# Patient Record
(demographics unavailable — no encounter records)

---

## 2024-12-23 NOTE — CARDIOLOGY SUMMARY
[de-identified] : 06/20/24 ECG: AF, demand V-paced. No sig change from 1/2/24. [de-identified] : 7/9/2024 TTE: LVIDd 4.52 cm, LVEF 60-65% w/o regional WMA, RV is dilated with normal function, severe HELLEN, mechanical MV is noted w/ normal appearing function (mean TVG is 4.94 mmHg at a HR 65 bpm), severe TR with PASP 41 mmHg, IVC is dilated, no pericardial effusion  8/08/2023 DANIEL: LV not well visualized due to mitral prosthesis, RV dilated and RV systolic function is normal, HELLEN, ALISHA is surgically ligated-no communication is seen by color flow, mechanical MV in MV position w/ normal appearing function (mean TVG 5 mmHg, HR 73 bpm), severe TR w/ incomplete coaptation of TV, the septal leaflet is restricted, RASP 23 mmHg, no pericardial effusion  6/29/2023 TTE: LVIDD 4.81 cm, no LVH, LVEF 55-60% abnl septal wall motion seen w/ right ventricular volume overload, diastolic function challenging in the presence of bioprosthetic MV, RV is severely dilated and systolic function is mildly reduced, severe HELLEN, mechanical MV w/ normal appearing function (mean TVG 4 mmHg at 60 bpm), mild AR, severe TR, mild PH, PASP 43 mmHg, IVC dilated, no pericardial effusion  [de-identified] : 06/06/24 RHC: RA 10 (v-wave 18), RV 33/9, PA 35/12/20, PCW 10, PA 75%, Ao 95%, CO/CI (F) 4.1/3.0, PVR 2.4, Jaqui 23, IVC 12, hepatic wedge 12, transhepatic gradient 0. No BP given to calculate SVR.

## 2024-12-23 NOTE — REASON FOR VISIT
[FreeTextEntry1] : PATIENT INSTRUCTIONS:  1. Go back to see Dr. Bruner and he will do an echocardiogram. 2. Continue your current medications. 3. Follow-up with Dr. Escobedo in 6 months and continue routine care with Dr. Mckenna.

## 2024-12-23 NOTE — PHYSICAL EXAM
[de-identified] : MMM, no perioral cyanosis [de-identified] : pulsatile JVP 8 cm H2O without HJR [de-identified] : III/VI KAUSHAL at [facility] [de-identified] : nonpulsatile liver edge [de-identified] : warm and dry

## 2024-12-23 NOTE — HISTORY OF PRESENT ILLNESS
[FreeTextEntry1] : Ms. Alexandra Tong is a 55 YO F w/ preserved LV function, paroxysmal AFib, mitral stenosis due to rheumatic heart disease s/p mechanical MVR & ALISHA occlusion (2004), heart block s/p Micra PPM 5/2020 and severe TR____. She is here for routine follow-up with her  and an  service by telephone was utilized.  She has felt much better since the introduction of Jardiance in April along with a reduction of furosemide to 10 mg daily. 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 She has in the interval completed a follow up with SHREID/Dr. Bruner who had ordered a TTE (details below) and recommended a CPET__given the persistent severe TR__ She denies CP, SOB at rest, orthopnea, PND, LH/dizziness, abdominal discomfort, LE edema, palpitations, and syncope. Her appetite is normal, and her bladder habits are unchanged. She has a Micra PPM in place. She does not limit fluid but does reduce sodium in her diet and she is taking her medications as directed. She does not use NSAIDs. She has not been admitted to the hospital or seen in the ER for HF in the interim.

## 2024-12-23 NOTE — DISCUSSION/SUMMARY
[FreeTextEntry2] : and her  [FreeTextEntry1] : 52 yo F with mechanical MVR on warfarin, AFib, hypertension, and severe TR who is overall doing well and improved on current therapies. She is ACC/AHA Stage C right HF, NYHA Class II and euvolemic today. I have recommended the followin. NICM/Valvular Cardiomyopathy - Her TR was severe on echos dating as far back as  without overt sequalae until more recently when she developed RV/RA dilation and required diuretics. Much improved on SGLT2-i and recent RHC with reasonable filling pressures.  Labs 2024 Na 136, K 4.2, Cl 105, CO2 9, BUN 18, Cr 0.52, proBNP (2024) 192   2. Hypertension - well controlled on current therapies. It is difficult to know if she has diastolic impairment as the mechanical MV impedes the echo measurement. So, unknown if she has HFpEF due to her HTN, but the echo from  had normal biatrial size following the MVR and she currently has biatrial enlargement.  3. Pulm Htn - Recent RHC with PA . Improved with diuresis.  4. Severe TR - TTE requested at time of RHC (in recovery area), but it was not done. TTE on max GDMT in 2024 continued to demonstrate severe TR___ recommended for CPET to assess functional status.   5. Chronic AF - On beta blocker and digoxin with rate control and back-up Micra PPM. On warfarin more for mechanical MVR as she has ALISHA occlusion at time of surgery.  6. Follow-up with me in 6 months. Routine care with Dr. Mckenna and schedule with Dr. Bruner in coming weeks,

## 2025-01-17 NOTE — HISTORY OF PRESENT ILLNESS
[FreeTextEntry1] : / German # [id number]  Ms. Alexandra Tong is a 53 YO F w/ valvular CMY due to RHD, preserved LV function, AFib, mitral stenosis s/p mechanical MVR & ALISHA occlusion (2004), heart block s/p Micra PPM 5/2020 and persistent severe TR. She is here for routine follow-up with her  and  service by telephone was utilized.  She has felt much better since the introduction of Jardiance in April along with a reduction of furosemide to 10 mg daily. 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 She has in the interval completed a follow up with SHREID/Dr. Bruner who had ordered a TTE (details below) and recommended a CPET given the persistent severe TR to assess functional status.   She denies CP, SOB at rest, orthopnea, PND, LH/dizziness, abdominal discomfort, LE edema, palpitations, and syncope. Her appetite is normal, and her bladder habits are unchanged. She has a Micra PPM in place. She does not limit fluid but does reduce sodium in her diet and she is taking her medications as directed. She does not use NSAIDs. She has not been admitted to the hospital or seen in the ER for HF in the interim.

## 2025-01-17 NOTE — END OF VISIT
[FreeTextEntry3] : I, Dr. Escobedo, personally performed the evaluation and management (E/M) services for this established patient who presents today with (a) new problem(s)/exacerbation of (an) existing condition(s).  That E/M includes conducting the examination, assessing all new/exacerbated conditions, and establishing a new plan of care.  Today, my EVAN, Veronika Star, was here to observe my evaluation and management services for this new problem/exacerbated condition to be followed going forward.  [Time Spent: ___ minutes] : I have spent [unfilled] minutes of time on the encounter which excludes teaching and separately reported services.

## 2025-01-17 NOTE — PHYSICAL EXAM
[Normal S1, S2] : normal S1, S2 [No Rub] : no rub [No Gallop] : no gallop [Murmur] : murmur [Soft] : abdomen soft [Non Tender] : non-tender [Normal Bowel Sounds] : normal bowel sounds [Normal Radial B/L] : normal radial B/L [Normal] : moves all extremities, no focal deficits, normal speech [Alert and Oriented] : alert and oriented [No Xanthelasma] : no xanthelasma [de-identified] : MMM, no perioral cyanosis [de-identified] : pulsatile JVP 10-12 cm H2O with prominent V waves [de-identified] : III/VI KAUSHAL at [facility] [de-identified] : nonpulsatile liver edge [de-identified] : warm and dry

## 2025-01-17 NOTE — PHYSICAL EXAM
[Normal S1, S2] : normal S1, S2 [No Rub] : no rub [No Gallop] : no gallop [Murmur] : murmur [Soft] : abdomen soft [Non Tender] : non-tender [Normal Bowel Sounds] : normal bowel sounds [Normal Radial B/L] : normal radial B/L [Normal] : moves all extremities, no focal deficits, normal speech [Alert and Oriented] : alert and oriented [No Xanthelasma] : no xanthelasma [de-identified] : MMM, no perioral cyanosis [de-identified] : pulsatile JVP 10-12 cm H2O with prominent V waves [de-identified] : III/VI KAUSHAL at [facility] [de-identified] : nonpulsatile liver edge [de-identified] : warm and dry

## 2025-01-17 NOTE — PHYSICAL EXAM
[Normal S1, S2] : normal S1, S2 [No Rub] : no rub [No Gallop] : no gallop [Murmur] : murmur [Soft] : abdomen soft [Non Tender] : non-tender [Normal Bowel Sounds] : normal bowel sounds [Normal Radial B/L] : normal radial B/L [Normal] : moves all extremities, no focal deficits, normal speech [Alert and Oriented] : alert and oriented [No Xanthelasma] : no xanthelasma [de-identified] : MMM, no perioral cyanosis [de-identified] : pulsatile JVP 10-12 cm H2O with prominent V waves [de-identified] : III/VI KAUSHAL at [facility] [de-identified] : nonpulsatile liver edge [de-identified] : warm and dry

## 2025-01-17 NOTE — REASON FOR VISIT
[Cardiac Failure] : cardiac failure [Language Line ] : provided by Language Line   [Time Spent: ____ minutes] : Total time spent using  services: [unfilled] minutes. The patient's primary language is not English thus required  services. [Interpreters_IDNumber] : [id number] [TWNoteComboBox1] : Maltese [FreeTextEntry1] : PATIENT INSTRUCTIONS:  1. Continue your current medicatons 2. Check labs today 3. We will schedule a 6 minute walk test at location: [address] [place] [address] 4. Follow up with Dr. Escobedo in 6 months

## 2025-01-17 NOTE — HISTORY OF PRESENT ILLNESS
[FreeTextEntry1] : / Slovenian # [id number]  Ms. Alexandra Tong is a 55 YO F w/ valvular CMY due to RHD, preserved LV function, AFib, mitral stenosis s/p mechanical MVR & ALISHA occlusion (2004), heart block s/p Micra PPM 5/2020 and persistent severe TR. She is here for routine follow-up with her  and  service by telephone was utilized.  She has felt much better since the introduction of Jardiance in April along with a reduction of furosemide to 10 mg daily. 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 She has in the interval completed a follow up with SHREID/Dr. Bruner who had ordered a TTE (details below) and recommended a CPET given the persistent severe TR to assess functional status.   She denies CP, SOB at rest, orthopnea, PND, LH/dizziness, abdominal discomfort, LE edema, palpitations, and syncope. Her appetite is normal, and her bladder habits are unchanged. She has a Micra PPM in place. She does not limit fluid but does reduce sodium in her diet and she is taking her medications as directed. She does not use NSAIDs. She has not been admitted to the hospital or seen in the ER for HF in the interim.

## 2025-01-17 NOTE — CARDIOLOGY SUMMARY
[de-identified] : 1/17/2025 ECG: V paced at 69 bpm, no sig change compared to 6/20/2024 06/20/24 ECG: AF, demand V-paced. No sig change from 1/2/24. [de-identified] : 7/9/2024 TTE: LVIDd 4.52 cm, LVEF 60-65% w/o regional WMA, RV is dilated with normal function, severe HELLEN, mechanical MV is noted w/ normal appearing function (mean TVG is 4.94 mmHg at a HR 65 bpm), severe TR with PASP 41 mmHg, IVC is dilated, no pericardial effusion  8/08/2023 DANIEL: LV not well visualized due to mitral prosthesis, RV dilated and RV systolic function is normal, HELLEN, ALISHA is surgically ligated-no communication is seen by color flow, mechanical MV in MV position w/ normal appearing function (mean TVG 5 mmHg, HR 73 bpm), severe TR w/ incomplete coaptation of TV, the septal leaflet is restricted, RASP 23 mmHg, no pericardial effusion  6/29/2023 TTE: LVIDD 4.81 cm, no LVH, LVEF 55-60% abnl septal wall motion seen w/ right ventricular volume overload, diastolic function challenging in the presence of bioprosthetic MV, RV is severely dilated and systolic function is mildly reduced, severe HELLEN, mechanical MV w/ normal appearing function (mean TVG 4 mmHg at 60 bpm), mild AR, severe TR, mild PH, PASP 43 mmHg, IVC dilated, no pericardial effusion  [de-identified] : 06/06/24 RHC: RA 10 (v-wave 18), RV 33/9, PA 35/12/20, PCW 10, PA 75%, Ao 95%, CO/CI (F) 4.1/3.0, PVR 2.4, Jaqui 23, IVC 12, hepatic wedge 12, transhepatic gradient 0. No BP given to calculate SVR.

## 2025-01-17 NOTE — REASON FOR VISIT
[Cardiac Failure] : cardiac failure [Language Line ] : provided by Language Line   [Time Spent: ____ minutes] : Total time spent using  services: [unfilled] minutes. The patient's primary language is not English thus required  services. [Interpreters_IDNumber] : [id number] [TWNoteComboBox1] : Chadian [FreeTextEntry1] : PATIENT INSTRUCTIONS:  1. Continue your current medicatons 2. Check labs today 3. We will schedule a 6 minute walk test at location: [address] [place] [address] 4. Follow up with Dr. Escobedo in 6 months

## 2025-01-17 NOTE — HISTORY OF PRESENT ILLNESS
[FreeTextEntry1] : / Wolof # [id number]  Ms. Alexandra Tong is a 55 YO F w/ valvular CMY due to RHD, preserved LV function, AFib, mitral stenosis s/p mechanical MVR & ALISHA occlusion (2004), heart block s/p Micra PPM 5/2020 and persistent severe TR. She is here for routine follow-up with her  and  service by telephone was utilized.  She has felt much better since the introduction of Jardiance in April along with a reduction of furosemide to 10 mg daily. 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 She has in the interval completed a follow up with SHREID/Dr. Bruner who had ordered a TTE (details below) and recommended a CPET given the persistent severe TR to assess functional status.   She denies CP, SOB at rest, orthopnea, PND, LH/dizziness, abdominal discomfort, LE edema, palpitations, and syncope. Her appetite is normal, and her bladder habits are unchanged. She has a Micra PPM in place. She does not limit fluid but does reduce sodium in her diet and she is taking her medications as directed. She does not use NSAIDs. She has not been admitted to the hospital or seen in the ER for HF in the interim.

## 2025-01-17 NOTE — DISCUSSION/SUMMARY
[Patient] : the patient [EKG obtained to assist in diagnosis and management of assessed problem(s)] : EKG obtained to assist in diagnosis and management of assessed problem(s) [FreeTextEntry2] : and her  [FreeTextEntry1] : 53 yo F with RHD s/p mechanical MVR on warfarin, AFib, hypertension, and severe TR who is overall doing well and improved on current therapies. She is ACC/AHA Stage C HF, NYHA Class I-II and euvolemic today. I have recommended the followin. NICM/Valvular Cardiomyopathy - Her TR was severe on echos dating as far back as  without overt sequalae until more recently when she developed RV/RA dilation and required diuretics. Much improved on SGLT2-i and recent RHC with good filling pressures. Will obtain 6MWT to formally assess functional status but her self report sounds to be minimally limited. Labs 2024 Na 136, K 4.2, Cl 105, CO2 9, BUN 18, Cr 0.52, proBNP (2024) 192. Check labs today including CMP/proBNP. If she becomes more symptomatic, would consider switching ACE-I and CCB to ARNI.  2. Hypertension - well controlled on current therapies. It is difficult to know if she has diastolic impairment as the mechanical MV impedes the echo measurement. No LVH to suggest she has HFpEF due to HHD.    3. Pulm Htn - Improved based on last RHC with PA 35//20.   4. Severe TR - TTE on current therapies in 2024 showed severe TR but she seems asymptomatic. Will get 6MWT for quantifiable functional assessment.   5. Chronic AF - s/p ALISHA occlusion. On beta blocker and digoxin with rate control and back-up Micra PPM. On warfarin for mechanical MVR.  6. Follow-up with Dr. Escobedo in 6 months. Routine care with Dr. Mckenna.

## 2025-01-17 NOTE — PHYSICAL EXAM
[Normal S1, S2] : normal S1, S2 [No Rub] : no rub [No Gallop] : no gallop [Murmur] : murmur [Soft] : abdomen soft [Non Tender] : non-tender [Normal Bowel Sounds] : normal bowel sounds [Normal Radial B/L] : normal radial B/L [Normal] : moves all extremities, no focal deficits, normal speech [Alert and Oriented] : alert and oriented [No Xanthelasma] : no xanthelasma [de-identified] : MMM, no perioral cyanosis [de-identified] : pulsatile JVP 10-12 cm H2O with prominent V waves [de-identified] : III/VI KAUSHAL at [facility] [de-identified] : nonpulsatile liver edge [de-identified] : warm and dry

## 2025-01-17 NOTE — REASON FOR VISIT
[Cardiac Failure] : cardiac failure [Language Line ] : provided by Language Line   [Time Spent: ____ minutes] : Total time spent using  services: [unfilled] minutes. The patient's primary language is not English thus required  services. [Interpreters_IDNumber] : [id number] [TWNoteComboBox1] : Kazakh [FreeTextEntry1] : PATIENT INSTRUCTIONS:  1. Continue your current medicatons 2. Check labs today 3. We will schedule a 6 minute walk test at location: [address] [place] [address] 4. Follow up with Dr. Escobedo in 6 months

## 2025-01-17 NOTE — CARDIOLOGY SUMMARY
[de-identified] : 1/17/2025 ECG: V paced at 69 bpm, no sig change compared to 6/20/2024 06/20/24 ECG: AF, demand V-paced. No sig change from 1/2/24. [de-identified] : 7/9/2024 TTE: LVIDd 4.52 cm, LVEF 60-65% w/o regional WMA, RV is dilated with normal function, severe HELLEN, mechanical MV is noted w/ normal appearing function (mean TVG is 4.94 mmHg at a HR 65 bpm), severe TR with PASP 41 mmHg, IVC is dilated, no pericardial effusion  8/08/2023 DANIEL: LV not well visualized due to mitral prosthesis, RV dilated and RV systolic function is normal, HELLEN, ALISHA is surgically ligated-no communication is seen by color flow, mechanical MV in MV position w/ normal appearing function (mean TVG 5 mmHg, HR 73 bpm), severe TR w/ incomplete coaptation of TV, the septal leaflet is restricted, RASP 23 mmHg, no pericardial effusion  6/29/2023 TTE: LVIDD 4.81 cm, no LVH, LVEF 55-60% abnl septal wall motion seen w/ right ventricular volume overload, diastolic function challenging in the presence of bioprosthetic MV, RV is severely dilated and systolic function is mildly reduced, severe HELLEN, mechanical MV w/ normal appearing function (mean TVG 4 mmHg at 60 bpm), mild AR, severe TR, mild PH, PASP 43 mmHg, IVC dilated, no pericardial effusion  [de-identified] : 06/06/24 RHC: RA 10 (v-wave 18), RV 33/9, PA 35/12/20, PCW 10, PA 75%, Ao 95%, CO/CI (F) 4.1/3.0, PVR 2.4, Jaqui 23, IVC 12, hepatic wedge 12, transhepatic gradient 0. No BP given to calculate SVR.

## 2025-01-17 NOTE — HISTORY OF PRESENT ILLNESS
[FreeTextEntry1] : / Sinhala # [id number]  Ms. Alexandra Tong is a 53 YO F w/ valvular CMY due to RHD, preserved LV function, AFib, mitral stenosis s/p mechanical MVR & ALISHA occlusion (2004), heart block s/p Micra PPM 5/2020 and persistent severe TR. She is here for routine follow-up with her  and  service by telephone was utilized.  She has felt much better since the introduction of Jardiance in April along with a reduction of furosemide to 10 mg daily. 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 She has in the interval completed a follow up with SHREID/Dr. Bruner who had ordered a TTE (details below) and recommended a CPET given the persistent severe TR to assess functional status.   She denies CP, SOB at rest, orthopnea, PND, LH/dizziness, abdominal discomfort, LE edema, palpitations, and syncope. Her appetite is normal, and her bladder habits are unchanged. She has a Micra PPM in place. She does not limit fluid but does reduce sodium in her diet and she is taking her medications as directed. She does not use NSAIDs. She has not been admitted to the hospital or seen in the ER for HF in the interim.

## 2025-01-17 NOTE — CARDIOLOGY SUMMARY
[de-identified] : 1/17/2025 ECG: V paced at 69 bpm, no sig change compared to 6/20/2024 06/20/24 ECG: AF, demand V-paced. No sig change from 1/2/24. [de-identified] : 7/9/2024 TTE: LVIDd 4.52 cm, LVEF 60-65% w/o regional WMA, RV is dilated with normal function, severe HELLEN, mechanical MV is noted w/ normal appearing function (mean TVG is 4.94 mmHg at a HR 65 bpm), severe TR with PASP 41 mmHg, IVC is dilated, no pericardial effusion  8/08/2023 DANIEL: LV not well visualized due to mitral prosthesis, RV dilated and RV systolic function is normal, HELLEN, ALISHA is surgically ligated-no communication is seen by color flow, mechanical MV in MV position w/ normal appearing function (mean TVG 5 mmHg, HR 73 bpm), severe TR w/ incomplete coaptation of TV, the septal leaflet is restricted, RASP 23 mmHg, no pericardial effusion  6/29/2023 TTE: LVIDD 4.81 cm, no LVH, LVEF 55-60% abnl septal wall motion seen w/ right ventricular volume overload, diastolic function challenging in the presence of bioprosthetic MV, RV is severely dilated and systolic function is mildly reduced, severe HELLEN, mechanical MV w/ normal appearing function (mean TVG 4 mmHg at 60 bpm), mild AR, severe TR, mild PH, PASP 43 mmHg, IVC dilated, no pericardial effusion  [de-identified] : 06/06/24 RHC: RA 10 (v-wave 18), RV 33/9, PA 35/12/20, PCW 10, PA 75%, Ao 95%, CO/CI (F) 4.1/3.0, PVR 2.4, Jaqui 23, IVC 12, hepatic wedge 12, transhepatic gradient 0. No BP given to calculate SVR.

## 2025-01-17 NOTE — REASON FOR VISIT
[Cardiac Failure] : cardiac failure [Language Line ] : provided by Language Line   [Time Spent: ____ minutes] : Total time spent using  services: [unfilled] minutes. The patient's primary language is not English thus required  services. [Interpreters_IDNumber] : [id number] [TWNoteComboBox1] : Central African [FreeTextEntry1] : PATIENT INSTRUCTIONS:  1. Continue your current medicatons 2. Check labs today 3. We will schedule a 6 minute walk test at location: [address] [place] [address] 4. Follow up with Dr. Escobedo in 6 months

## 2025-01-17 NOTE — CARDIOLOGY SUMMARY
[de-identified] : 1/17/2025 ECG: V paced at 69 bpm, no sig change compared to 6/20/2024 06/20/24 ECG: AF, demand V-paced. No sig change from 1/2/24. [de-identified] : 7/9/2024 TTE: LVIDd 4.52 cm, LVEF 60-65% w/o regional WMA, RV is dilated with normal function, severe HELLEN, mechanical MV is noted w/ normal appearing function (mean TVG is 4.94 mmHg at a HR 65 bpm), severe TR with PASP 41 mmHg, IVC is dilated, no pericardial effusion  8/08/2023 DANIEL: LV not well visualized due to mitral prosthesis, RV dilated and RV systolic function is normal, HELLEN, ALISHA is surgically ligated-no communication is seen by color flow, mechanical MV in MV position w/ normal appearing function (mean TVG 5 mmHg, HR 73 bpm), severe TR w/ incomplete coaptation of TV, the septal leaflet is restricted, RASP 23 mmHg, no pericardial effusion  6/29/2023 TTE: LVIDD 4.81 cm, no LVH, LVEF 55-60% abnl septal wall motion seen w/ right ventricular volume overload, diastolic function challenging in the presence of bioprosthetic MV, RV is severely dilated and systolic function is mildly reduced, severe HELLEN, mechanical MV w/ normal appearing function (mean TVG 4 mmHg at 60 bpm), mild AR, severe TR, mild PH, PASP 43 mmHg, IVC dilated, no pericardial effusion  [de-identified] : 06/06/24 RHC: RA 10 (v-wave 18), RV 33/9, PA 35/12/20, PCW 10, PA 75%, Ao 95%, CO/CI (F) 4.1/3.0, PVR 2.4, Jaqui 23, IVC 12, hepatic wedge 12, transhepatic gradient 0. No BP given to calculate SVR.

## 2025-05-10 NOTE — PHYSICAL EXAM
[Well Developed] : well developed [Well Nourished] : well nourished [No Acute Distress] : no acute distress [Normal Conjunctiva] : normal conjunctiva [Normal S1, S2] : normal S1, S2 [No Rub] : no rub [No Gallop] : no gallop [Murmur] : murmur [No Cyanosis] : no cyanosis [No Clubbing] : no clubbing [Edema ___] : edema [unfilled] [Normal] : alert and oriented, normal memory [de-identified] : supple  [de-identified] : III/VI [facility] [de-identified] : + mechanical heart sounds

## 2025-05-10 NOTE — PHYSICAL EXAM
[Well Developed] : well developed [Well Nourished] : well nourished [No Acute Distress] : no acute distress [Normal Conjunctiva] : normal conjunctiva [Normal S1, S2] : normal S1, S2 [No Rub] : no rub [No Gallop] : no gallop [Murmur] : murmur [No Cyanosis] : no cyanosis [No Clubbing] : no clubbing [Edema ___] : edema [unfilled] [Normal] : alert and oriented, normal memory [de-identified] : supple  [de-identified] : III/VI [facility] [de-identified] : + mechanical heart sounds

## 2025-05-10 NOTE — HISTORY OF PRESENT ILLNESS
[FreeTextEntry1] : Cardiologist: Dr. Mckenna HF: Dr. Escobedo   54[facility] [place] [facility] with PMH of HTN, PAF, mitral stenosis due to rheumatic heart disease s/p mechanical MVR & ALISHA occlusion (2004, at [facility], on warfarin), heart block s/p Micra PPM 5/2020, HFpEF and severe TR who presents for follow up.   Patient was seen in [facility] back in 8/2023 at which time DANIEL review showed incomplete coaptation of the tricuspid valve. The septal leaflet is restricted. There is severe tricuspid regurgitation. Treatment options were discussed. Patient was referred to Dr. Aleman (CT surgeon) for surgical consideration with STS of 1-3% operative mortality and complication risk. Dr. Aleman felt that patient would benefit from and is a candidate for Reop, minimally invasive, TV repair/possible replacement w/bioprosthesis.   Patient has been following up with her cardiologist and with Dr. Escobedo. Was on stable GDMT, then underwent RHC on 6/10/24 that showed RA 10 (V wave 18 mm Hg), RV 33/9, PA 35/12 (20), PCWP 10, Ao Sat 95%, PA Sat 75%, CO/CI 4.1/3, TPG 10 mmHg, PVR 2.4 TRAORE, ANDREW 23.  Seen in [facility] on 7/9/25 for follow up at which time patient reported feeling "good". Exercise tolerance of 0-30 blocks or 15 steps when going up a flight of stairs exercise tolerance limited by fatigue. 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 Reported intermittent BLE edema and with 2 dental carries pending treatment.  TTE on 7/9/24 showed normal BIV function, moderately dilated RV, severe HELLEN, severe/torrential (5/5) atrial functional TR, mechanical mitral valve with normal function. Est PASP 41 mmHg. In the absence of symptoms and good quality of life, Dr. Bruner d/w Dr. Escobedo re: possibly obtaining a CPEP to objective assess her functional status.   CPET 9/5/24 Interpretation: Submaximal test stopped by technician due to patient difficulty with test protocol. Low normal peak VO2 and mildly reduced peak work. Excess heart rate reserve. Normal O2 pulse, though this can overstate stroke volume in the setting of beta blockage. Excess breathing reserve. Normal gas exchange.   2/27/2025 6MWT: Rest /60, O2 98% on RA. Post Walk /58, O2 97% on RA. Max HR 96 bpm. Total distance is 406 meters consistent with NYHA Class II data.  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 Occasionally runs 2-3 blocks to catch a train without issue. Rare episode of profound fatigue with some shortness a breath, had one episode in the past 3 months. +BLE edema that is worse at night better in the morning. Denies any recent chest pain, worsening shortness of breath, palpitations, lightheadedness/dizziness or syncope, orthopnea, PND, abdominal bloating, fever, chills, night sweats, dysuria, or recent hospitalizations. Has a molar that needs a root canal.

## 2025-05-10 NOTE — PLAN
[TextEntry] : - continue current medication regimen. - IE Abx ppx 30-60 min prior to dental procedure.  - continue close follow up with Dr. Escobedo team for GDMT management. - follow up with Dr. Mckenna as scheduled for ongoing cardiology care to help coordinate her endodontist needs. - continue warfarin per patient's anticoagulation clinic.  - return to [facility] in 6 months with repeat TTE/labs and a visit with Dr. Bruner for clinical reassessment or as needed.   I, Dr. Wade Bruner, personally performed the evaluation and management (E/M) services for this established patient who presents today with (a) new problem(s)/exacerbation of (an) existing condition(s). That E/M includes conducting the clinically appropriate interval history &/or exam, assessing all new/exacerbated conditions, and establishing a new plan of care. Today, my EVAN, noted herewith, was here to observe my evaluation and management service for this new problem/exacerbated condition and follow the plan of care established by me going forward.

## 2025-05-10 NOTE — REASON FOR VISIT
[Structural Heart and Valve Disease] : structural heart and valve disease [Language Line ] : provided by Language Line   [Time Spent: ____ minutes] : Total time spent using  services: [unfilled] minutes. The patient's primary language is not English thus required  services. [Interpreters_IDNumber] : [id number] [Interpreters_FullName] : Ibrahima [TWNoteComboBox1] : Canadian

## 2025-05-10 NOTE — ASSESSMENT
[FreeTextEntry1] : 54F, Colombian speaking from [place], with PMH of HTN, PAF, mitral stenosis due to rheumatic heart disease s/p mechanical MVR & ALISHA occlusion (2004, at [facility], on warfarin), heart block s/p Micra PPM 5/2020, HFpEF and severe TR who presents for follow up. Dr. Bruner has independently seen and evaluated the patient in this face-to-face encounter and has reviewed patient's history and pertinent clinical data. Patient is clinically stable, NYHA Class I/II, well compensated on exam with no increasing diuretic requirement since last seen in July 2024. TTE on 4/28/25 showed normal BiV function, moderate RV enlargement, mechanical mitral valve well seated with normal function, torrential TR - Compared to the transthoracic echocardiogram performed on 7/9/2024, there have been no significant interval changes. Results discussed with patient. Given good quality of life and absence of significant symptoms on good GDMT, Dr. Bruner recommends continuing close clinical monitoring at this time. The patient will continue to follow up with HF for GDMT management, her cardiologist for ongoing cardiology care, and we advised that she should proceed with getting a root canal for her molar - her cardiologist/dentist/anticoagulation clinic should coordinate with her AC management given mechanical mitral. Reminded the need for IE Abx ppx prior to any dental procedure. The patient will return to [facility] in 6 months with TTE/labs and an outpatient visit with Dr. Bruner for clinical reassessment or sooner should she develop signs of worsening HF symptoms. All questions were addressed.

## 2025-05-10 NOTE — CARDIOLOGY SUMMARY
[de-identified] : 6/20/24: V-paced/Afib rate 65 [de-identified] :  DANIEL 8/8/23 CONCLUSIONS: 1. LV systolic function not wel visualized due to shadowing from mitral prosthesis. 2. Dilated right ventricular size. 3. Normal right ventricular systolic function. 4. A mechanical valve is noted in the mitral position which appears to be functioning normally. The mean transvalvular gradient is 5.00 mmHg at a heart rate of 73 bpm. There is trace mitral regurgitation. 5. Aortic sclerosis without significant stenosis. 6. Trace aortic regurgitation. 7. There is incomplete coaptation of the tricuspid valve. The septal leaflet motion is restricted. There is severe tricuspid regurgitation. Pulmonary artery systolic pressure (estimated using the tricuspid regurgitant gradient and an estimate of right atrial pressure) is 23 mmHg. 8. No evidence of an intracardiac shunt. 9. No pericardial effusion.  TTE 6/29/23 CONCLUSIONS: 1. Normal left ventricular size and systolic function. 2. Severely dilated right ventricular size. 3. Mildly reduced right ventricular systolic function. 4. Biatrial enlargement. 5. Mechanical valve is seen in the mitral position with apparent normal function, without evidence of prosthetic dysfunction. 6. Severe (massive, 4+) tricuspid regurgitation. 7. Mild pulmonary hypertension.  [de-identified] : 6/6/24:  The right heart catheterization demonstrated normal cardiac output with mildly elevated right sided filling pressures and no significant PH.  Hepatic wedge 12 mmHg, IVC 12 mmHg- no gradient - RA 10, v wave 18mmHg - RV 33/9 mmHg - Pa 35/12 (20) mmHg - PCWP 10mmHg Ao 95%, Pa 75% CO/CI 4.1 L/min, 3.0/L/min/m2 - TPG 10mmHg - PVR 2.4 TRAORE - ANDREW 23  [de-identified] : 7/1/24 Labs reviewed:  WBC 3.82 H/H 12/38.7 Plt 161 BUN/Cr 18/0.52  6/20/24: ProBNP 192

## 2025-05-10 NOTE — PHYSICAL EXAM
[Well Developed] : well developed [Well Nourished] : well nourished [No Acute Distress] : no acute distress [Normal Conjunctiva] : normal conjunctiva [Normal S1, S2] : normal S1, S2 [No Rub] : no rub [No Gallop] : no gallop [Murmur] : murmur [No Cyanosis] : no cyanosis [No Clubbing] : no clubbing [Edema ___] : edema [unfilled] [Normal] : alert and oriented, normal memory [de-identified] : supple  [de-identified] : III/VI [facility] [de-identified] : + mechanical heart sounds

## 2025-05-10 NOTE — REASON FOR VISIT
[Structural Heart and Valve Disease] : structural heart and valve disease [Language Line ] : provided by Language Line   [Time Spent: ____ minutes] : Total time spent using  services: [unfilled] minutes. The patient's primary language is not English thus required  services. [Interpreters_IDNumber] : [id number] [Interpreters_FullName] : Ibrahima [TWNoteComboBox1] : Sammarinese

## 2025-05-10 NOTE — HISTORY OF PRESENT ILLNESS
[FreeTextEntry1] : Cardiologist: Dr. Mckenna HF: Dr. Escobedo   54[place], with PMH of HTN, PAF, mitral stenosis due to rheumatic heart disease s/p mechanical MVR & ALISHA occlusion (2004, at [facility], on warfarin), heart block s/p Micra PPM 5/2020, HFpEF and severe TR who presents for follow up.   Patient was seen in [facility] back in 8/2023 at which time DANIEL review showed incomplete coaptation of the tricuspid valve. The septal leaflet is restricted. There is severe tricuspid regurgitation. Treatment options were discussed. Patient was referred to Dr. Aleman (CT surgeon) for surgical consideration with STS of 1-3% operative mortality and complication risk. Dr. Aleman felt that patient would benefit from and is a candidate for Reop, minimally invasive, TV repair/possible replacement w/bioprosthesis.   Patient has been following up with her cardiologist and with Dr. Escobedo. Was on stable GDMT, then underwent RHC on 6/10/24 that showed RA 10 (V wave 18 mm Hg), RV 33/9, PA 35/12 (20), PCWP 10, Ao Sat 95%, PA Sat 75%, CO/CI 4.1/3, TPG 10 mmHg, PVR 2.4 TRAORE, ANDREW 23.  Seen in [facility] on 7/9/25 for follow up at which time patient reported feeling "good". Exercise tolerance of 0-30 blocks or 15 steps when going up a flight of stairs exercise tolerance limited by fatigue. 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 Reported intermittent BLE edema and with 2 dental carries pending treatment.  TTE on 7/9/24 showed normal BIV function, moderately dilated RV, severe HELLEN, severe/torrential (5/5) atrial functional TR, mechanical mitral valve with normal function. Est PASP 41 mmHg. In the absence of symptoms and good quality of life, Dr. Bruner d/w Dr. Escobedo re: possibly obtaining a CPEP to objective assess her functional status.   CPET 9/5/24 Interpretation: Submaximal test stopped by technician due to patient difficulty with test protocol. Low normal peak VO2 and mildly reduced peak work. Excess heart rate reserve. Normal O2 pulse, though this can overstate stroke volume in the setting of beta blockage. Excess breathing reserve. Normal gas exchange.   2/27/2025 6MWT: Rest /60, O2 98% on RA. Post Walk /58, O2 97% on RA. Max HR 96 bpm. Total distance is 406 meters consistent with NYHA Class II data.  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 Occasionally runs 2-3 blocks to catch a train without issue. Rare episode of profound fatigue with some shortness a breath, had one episode in the past 3 months. +BLE edema that is worse at night better in the morning. Denies any recent chest pain, worsening shortness of breath, palpitations, lightheadedness/dizziness or syncope, orthopnea, PND, abdominal bloating, fever, chills, night sweats, dysuria, or recent hospitalizations. Has a molar that needs a root canal.

## 2025-05-10 NOTE — CARDIOLOGY SUMMARY
[de-identified] : 6/20/24: V-paced/Afib rate 65 [de-identified] :  DANIEL 8/8/23 CONCLUSIONS: 1. LV systolic function not wel visualized due to shadowing from mitral prosthesis. 2. Dilated right ventricular size. 3. Normal right ventricular systolic function. 4. A mechanical valve is noted in the mitral position which appears to be functioning normally. The mean transvalvular gradient is 5.00 mmHg at a heart rate of 73 bpm. There is trace mitral regurgitation. 5. Aortic sclerosis without significant stenosis. 6. Trace aortic regurgitation. 7. There is incomplete coaptation of the tricuspid valve. The septal leaflet motion is restricted. There is severe tricuspid regurgitation. Pulmonary artery systolic pressure (estimated using the tricuspid regurgitant gradient and an estimate of right atrial pressure) is 23 mmHg. 8. No evidence of an intracardiac shunt. 9. No pericardial effusion.  TTE 6/29/23 CONCLUSIONS: 1. Normal left ventricular size and systolic function. 2. Severely dilated right ventricular size. 3. Mildly reduced right ventricular systolic function. 4. Biatrial enlargement. 5. Mechanical valve is seen in the mitral position with apparent normal function, without evidence of prosthetic dysfunction. 6. Severe (massive, 4+) tricuspid regurgitation. 7. Mild pulmonary hypertension.  [de-identified] : 6/6/24:  The right heart catheterization demonstrated normal cardiac output with mildly elevated right sided filling pressures and no significant PH.  Hepatic wedge 12 mmHg, IVC 12 mmHg- no gradient - RA 10, v wave 18mmHg - RV 33/9 mmHg - Pa 35/12 (20) mmHg - PCWP 10mmHg Ao 95%, Pa 75% CO/CI 4.1 L/min, 3.0/L/min/m2 - TPG 10mmHg - PVR 2.4 TRAORE - ANRDEW 23  [de-identified] : 7/1/24 Labs reviewed:  WBC 3.82 H/H 12/38.7 Plt 161 BUN/Cr 18/0.52  6/20/24: ProBNP 192

## 2025-05-10 NOTE — PLAN
[TextEntry] : - continue current medication regimen. - IE Abx ppx 30-60 min prior to dental procedure.  - continue close follow up with Dr. Escobedo team for GDMT management. - follow up with Dr. Mckenna as scheduled for ongoing cardiology care to help coordinate her endodontist needs. - continue warfarin per patient's anticoagulation clinic.  - return to [facility] in 6 months with repeat TTE/labs and a visit with Dr. Bruner for clinical reassessment or as needed.   I, Dr. Wade Bruner, personally performed the evaluation and management (E/M) services for this established patient who presents today with (a) new problem(s)/exacerbation of (an) existing condition(s). That E/M includes conducting the clinically appropriate interval history &/or exam, assessing all new/exacerbated conditions, and establishing a new plan of care. Today, my EVAN, noted herewith, was here to observe my evaluation and management service for this new problem/exacerbated condition and follow the plan of care established by me going forward.

## 2025-05-10 NOTE — REVIEW OF SYSTEMS
[Feeling Fatigued] : feeling fatigued [Lower Ext Edema] : lower extremity edema [Easy Bleeding] : a tendency for easy bleeding [Easy Bruising] : a tendency for easy bruising [Negative] : Psychiatric [Fever] : no fever [Headache] : no headache [Chills] : no chills [SOB] : no shortness of breath [Dyspnea on exertion] : not dyspnea during exertion [Chest Discomfort] : no chest discomfort [Leg Claudication] : no intermittent leg claudication [Palpitations] : no palpitations [Orthopnea] : no orthopnea [PND] : no PND [Syncope] : no syncope [FreeTextEntry2] : see HPI  [FreeTextEntry1] : + dental sensitivity

## 2025-05-10 NOTE — HISTORY OF PRESENT ILLNESS
[FreeTextEntry1] : Cardiologist: Dr. Mckenna HF: Dr. Escobedo   54[place], [place] [facility], with PMH of HTN, PAF, mitral stenosis due to rheumatic heart disease s/p mechanical MVR & ALISHA occlusion (2004, at [facility], on warfarin), heart block s/p Micra PPM 5/2020, HFpEF and severe TR who presents for follow up.   Patient was seen in [facility] back in 8/2023 at which time DANIEL review showed incomplete coaptation of the tricuspid valve. The septal leaflet is restricted. There is severe tricuspid regurgitation. Treatment options were discussed. Patient was referred to Dr. Aleman (CT surgeon) for surgical consideration with STS of 1-3% operative mortality and complication risk. Dr. Aleman felt that patient would benefit from and is a candidate for Reop, minimally invasive, TV repair/possible replacement w/bioprosthesis.   Patient has been following up with her cardiologist and with Dr. Escobedo. Was on stable GDMT, then underwent RHC on 6/10/24 that showed RA 10 (V wave 18 mm Hg), RV 33/9, PA 35/12 (20), PCWP 10, Ao Sat 95%, PA Sat 75%, CO/CI 4.1/3, TPG 10 mmHg, PVR 2.4 TRAORE, ANDREW 23.  Seen in [facility] on 7/9/25 for follow up at which time patient reported feeling "good". Exercise tolerance of 0-30 blocks or 15 steps when going up a flight of stairs exercise tolerance limited by fatigue. 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 Reported intermittent BLE edema and with 2 dental carries pending treatment.  TTE on 7/9/24 showed normal BIV function, moderately dilated RV, severe HELLEN, severe/torrential (5/5) atrial functional TR, mechanical mitral valve with normal function. Est PASP 41 mmHg. In the absence of symptoms and good quality of life, Dr. Bruner d/w Dr. Escobedo re: possibly obtaining a CPEP to objective assess her functional status.   CPET 9/5/24 Interpretation: Submaximal test stopped by technician due to patient difficulty with test protocol. Low normal peak VO2 and mildly reduced peak work. Excess heart rate reserve. Normal O2 pulse, though this can overstate stroke volume in the setting of beta blockage. Excess breathing reserve. Normal gas exchange.   2/27/2025 6MWT: Rest /60, O2 98% on RA. Post Walk /58, O2 97% on RA. Max HR 96 bpm. Total distance is 406 meters consistent with NYHA Class II data.  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 Occasionally runs 2-3 blocks to catch a train without issue. Rare episode of profound fatigue with some shortness a breath, had one episode in the past 3 months. +BLE edema that is worse at night better in the morning. Denies any recent chest pain, worsening shortness of breath, palpitations, lightheadedness/dizziness or syncope, orthopnea, PND, abdominal bloating, fever, chills, night sweats, dysuria, or recent hospitalizations. Has a molar that needs a root canal.

## 2025-05-10 NOTE — PLAN
[TextEntry] : - continue current medication regimen. - IE Abx ppx 30-60 min prior to dental procedure.  - continue close follow up with Dr. Escobedo team for GDMT management. - follow up with Dr. Mckenna as scheduled for ongoing cardiology care to help coordinate her endodontist needs. - continue warfarin per patient's anticoagulation clinic.  - return to [facility] in 6 months with repeat TTE/labs and a visit with Dr. Bruner for clinical reassessment or as needed.   I, Dr. Wade Bruner, personally performed the evaluation and management (E/M) services for this established patient who presents today with (a) new problem(s)/exacerbation of (an) existing condition(s). That E/M includes conducting the clinically appropriate interval history &/or exam, assessing all new/exacerbated conditions, and establishing a new plan of care. Today, my EVAN, noted herewith, was here to observe my evaluation and management service for this new problem/exacerbated condition and follow the plan of care established by me going forward.

## 2025-05-10 NOTE — PLAN
[TextEntry] : - continue current medication regimen. - IE Abx ppx 30-60 min prior to dental procedure.  - continue close follow up with Dr. Escobedo team for GDMT management. - follow up with Dr. Mckenna as scheduled for ongoing cardiology care to help coordinate her endodontist needs. - continue warfarin per patient's anticoagulation clinic.  - return to [facility] in 6 months with repeat TTE/labs and a visit with Dr. Bruner for clinical reassessment or as needed.   I, Dr. Wade Bruner, personally performed the evaluation and management (E/M) services for this established patient who presents today with (a) new problem(s)/exacerbation of (an) existing condition(s). That E/M includes conducting the clinically appropriate interval history &/or exam, assessing all new/exacerbated conditions, and establishing a new plan of care. Today, my EVAN, noted herewith, was here to observe my evaluation and management service for this new problem/exacerbated condition and follow the plan of care established by me going forward.

## 2025-05-10 NOTE — REASON FOR VISIT
[Structural Heart and Valve Disease] : structural heart and valve disease [Language Line ] : provided by Language Line   [Time Spent: ____ minutes] : Total time spent using  services: [unfilled] minutes. The patient's primary language is not English thus required  services. [Interpreters_IDNumber] : [id number] [Interpreters_FullName] : Ibrahima [TWNoteComboBox1] : Citizen of [place]

## 2025-05-10 NOTE — CARDIOLOGY SUMMARY
[de-identified] : 6/20/24: V-paced/Afib rate 65 [de-identified] :  DANIEL 8/8/23 CONCLUSIONS: 1. LV systolic function not wel visualized due to shadowing from mitral prosthesis. 2. Dilated right ventricular size. 3. Normal right ventricular systolic function. 4. A mechanical valve is noted in the mitral position which appears to be functioning normally. The mean transvalvular gradient is 5.00 mmHg at a heart rate of 73 bpm. There is trace mitral regurgitation. 5. Aortic sclerosis without significant stenosis. 6. Trace aortic regurgitation. 7. There is incomplete coaptation of the tricuspid valve. The septal leaflet motion is restricted. There is severe tricuspid regurgitation. Pulmonary artery systolic pressure (estimated using the tricuspid regurgitant gradient and an estimate of right atrial pressure) is 23 mmHg. 8. No evidence of an intracardiac shunt. 9. No pericardial effusion.  TTE 6/29/23 CONCLUSIONS: 1. Normal left ventricular size and systolic function. 2. Severely dilated right ventricular size. 3. Mildly reduced right ventricular systolic function. 4. Biatrial enlargement. 5. Mechanical valve is seen in the mitral position with apparent normal function, without evidence of prosthetic dysfunction. 6. Severe (massive, 4+) tricuspid regurgitation. 7. Mild pulmonary hypertension.  [de-identified] : 6/6/24:  The right heart catheterization demonstrated normal cardiac output with mildly elevated right sided filling pressures and no significant PH.  Hepatic wedge 12 mmHg, IVC 12 mmHg- no gradient - RA 10, v wave 18mmHg - RV 33/9 mmHg - Pa 35/12 (20) mmHg - PCWP 10mmHg Ao 95%, Pa 75% CO/CI 4.1 L/min, 3.0/L/min/m2 - TPG 10mmHg - PVR 2.4 TRAORE - ANDREW 23  [de-identified] : 7/1/24 Labs reviewed:  WBC 3.82 H/H 12/38.7 Plt 161 BUN/Cr 18/0.52  6/20/24: ProBNP 192

## 2025-05-10 NOTE — CARDIOLOGY SUMMARY
[de-identified] : 6/20/24: V-paced/Afib rate 65 [de-identified] :  DANIEL 8/8/23 CONCLUSIONS: 1. LV systolic function not wel visualized due to shadowing from mitral prosthesis. 2. Dilated right ventricular size. 3. Normal right ventricular systolic function. 4. A mechanical valve is noted in the mitral position which appears to be functioning normally. The mean transvalvular gradient is 5.00 mmHg at a heart rate of 73 bpm. There is trace mitral regurgitation. 5. Aortic sclerosis without significant stenosis. 6. Trace aortic regurgitation. 7. There is incomplete coaptation of the tricuspid valve. The septal leaflet motion is restricted. There is severe tricuspid regurgitation. Pulmonary artery systolic pressure (estimated using the tricuspid regurgitant gradient and an estimate of right atrial pressure) is 23 mmHg. 8. No evidence of an intracardiac shunt. 9. No pericardial effusion.  TTE 6/29/23 CONCLUSIONS: 1. Normal left ventricular size and systolic function. 2. Severely dilated right ventricular size. 3. Mildly reduced right ventricular systolic function. 4. Biatrial enlargement. 5. Mechanical valve is seen in the mitral position with apparent normal function, without evidence of prosthetic dysfunction. 6. Severe (massive, 4+) tricuspid regurgitation. 7. Mild pulmonary hypertension.  [de-identified] : 6/6/24:  The right heart catheterization demonstrated normal cardiac output with mildly elevated right sided filling pressures and no significant PH.  Hepatic wedge 12 mmHg, IVC 12 mmHg- no gradient - RA 10, v wave 18mmHg - RV 33/9 mmHg - Pa 35/12 (20) mmHg - PCWP 10mmHg Ao 95%, Pa 75% CO/CI 4.1 L/min, 3.0/L/min/m2 - TPG 10mmHg - PVR 2.4 TRAORE - ANDREW 23  [de-identified] : 7/1/24 Labs reviewed:  WBC 3.82 H/H 12/38.7 Plt 161 BUN/Cr 18/0.52  6/20/24: ProBNP 192

## 2025-05-10 NOTE — HISTORY OF PRESENT ILLNESS
[FreeTextEntry1] : Cardiologist: Dr. Mckenna HF: Dr. Escobedo   54[place], [place] [facility], with PMH of HTN, PAF, mitral stenosis due to rheumatic heart disease s/p mechanical MVR & ALISHA occlusion (2004, at [facility], on warfarin), heart block s/p Micra PPM 5/2020, HFpEF and severe TR who presents for follow up.   Patient was seen in [facility] back in 8/2023 at which time DANIEL review showed incomplete coaptation of the tricuspid valve. The septal leaflet is restricted. There is severe tricuspid regurgitation. Treatment options were discussed. Patient was referred to Dr. Aleman (CT surgeon) for surgical consideration with STS of 1-3% operative mortality and complication risk. Dr. Aleman felt that patient would benefit from and is a candidate for Reop, minimally invasive, TV repair/possible replacement w/bioprosthesis.   Patient has been following up with her cardiologist and with Dr. Escobedo. Was on stable GDMT, then underwent RHC on 6/10/24 that showed RA 10 (V wave 18 mm Hg), RV 33/9, PA 35/12 (20), PCWP 10, Ao Sat 95%, PA Sat 75%, CO/CI 4.1/3, TPG 10 mmHg, PVR 2.4 TRAORE, ANDREW 23.  Seen in [facility] on 7/9/25 for follow up at which time patient reported feeling "good". Exercise tolerance of 0-30 blocks or 15 steps when going up a flight of stairs exercise tolerance limited by fatigue. 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 Reported intermittent BLE edema and with 2 dental carries pending treatment.  TTE on 7/9/24 showed normal BIV function, moderately dilated RV, severe HELLEN, severe/torrential (5/5) atrial functional TR, mechanical mitral valve with normal function. Est PASP 41 mmHg. In the absence of symptoms and good quality of life, Dr. Bruner d/w Dr. Escobedo re: possibly obtaining a CPEP to objective assess her functional status.   CPET 9/5/24 Interpretation: Submaximal test stopped by technician due to patient difficulty with test protocol. Low normal peak VO2 and mildly reduced peak work. Excess heart rate reserve. Normal O2 pulse, though this can overstate stroke volume in the setting of beta blockage. Excess breathing reserve. Normal gas exchange.   2/27/2025 6MWT: Rest /60, O2 98% on RA. Post Walk /58, O2 97% on RA. Max HR 96 bpm. Total distance is 406 meters consistent with NYHA Class II data.  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 Occasionally runs 2-3 blocks to catch a train without issue. Rare episode of profound fatigue with some shortness a breath, had one episode in the past 3 months. +BLE edema that is worse at night better in the morning. Denies any recent chest pain, worsening shortness of breath, palpitations, lightheadedness/dizziness or syncope, orthopnea, PND, abdominal bloating, fever, chills, night sweats, dysuria, or recent hospitalizations. Has a molar that needs a root canal.

## 2025-05-10 NOTE — ASSESSMENT
[FreeTextEntry1] : 54F, Polish speaking from [place], with PMH of HTN, PAF, mitral stenosis due to rheumatic heart disease s/p mechanical MVR & ALISHA occlusion (2004, at [facility], on warfarin), heart block s/p Micra PPM 5/2020, HFpEF and severe TR who presents for follow up. Dr. Bruner has independently seen and evaluated the patient in this face-to-face encounter and has reviewed patient's history and pertinent clinical data. Patient is clinically stable, NYHA Class I/II, well compensated on exam with no increasing diuretic requirement since last seen in July 2024. TTE on 4/28/25 showed normal BiV function, moderate RV enlargement, mechanical mitral valve well seated with normal function, torrential TR - Compared to the transthoracic echocardiogram performed on 7/9/2024, there have been no significant interval changes. Results discussed with patient. Given good quality of life and absence of significant symptoms on good GDMT, Dr. Bruner recommends continuing close clinical monitoring at this time. The patient will continue to follow up with HF for GDMT management, her cardiologist for ongoing cardiology care, and we advised that she should proceed with getting a root canal for her molar - her cardiologist/dentist/anticoagulation clinic should coordinate with her AC management given mechanical mitral. Reminded the need for IE Abx ppx prior to any dental procedure. The patient will return to [facility] in 6 months with TTE/labs and an outpatient visit with Dr. Bruner for clinical reassessment or sooner should she develop signs of worsening HF symptoms. All questions were addressed.

## 2025-05-10 NOTE — PHYSICAL EXAM
[Well Developed] : well developed [Well Nourished] : well nourished [No Acute Distress] : no acute distress [Normal Conjunctiva] : normal conjunctiva [Normal S1, S2] : normal S1, S2 [No Rub] : no rub [No Gallop] : no gallop [Murmur] : murmur [No Cyanosis] : no cyanosis [No Clubbing] : no clubbing [Edema ___] : edema [unfilled] [Normal] : alert and oriented, normal memory Yes [de-identified] : supple  [de-identified] : III/VI [facility] [de-identified] : + mechanical heart sounds

## 2025-05-10 NOTE — ASSESSMENT
[FreeTextEntry1] : 54F, [place]ut[place] speaking from [place], with PMH of HTN, PAF, mitral stenosis due to rheumatic heart disease s/p mechanical MVR & ALISHA occlusion (2004, at [facility], on warfarin), heart block s/p Micra PPM 5/2020, HFpEF and severe TR who presents for follow up. Dr. Bruner has independently seen and evaluated the patient in this face-to-face encounter and has reviewed patient's history and pertinent clinical data. Patient is clinically stable, NYHA Class I/II, well compensated on exam with no increasing diuretic requirement since last seen in July 2024. TTE on 4/28/25 showed normal BiV function, moderate RV enlargement, mechanical mitral valve well seated with normal function, torrential TR - Compared to the transthoracic echocardiogram performed on 7/9/2024, there have been no significant interval changes. Results discussed with patient. Given good quality of life and absence of significant symptoms on good GDMT, Dr. Bruner recommends continuing close clinical monitoring at this time. The patient will continue to follow up with HF for GDMT management, her cardiologist for ongoing cardiology care, and we advised that she should proceed with getting a root canal for her molar - her cardiologist/dentist/anticoagulation clinic should coordinate with her AC management given mechanical mitral. Reminded the need for IE Abx ppx prior to any dental procedure. The patient will return to [facility] clinic in 6 months with TTE/labs and an outpatient visit with Dr. Bruner for clinical reassessment or sooner should she develop signs of worsening HF symptoms. All questions were addressed.

## 2025-05-22 NOTE — PHYSICAL EXAM
[Normal] : the outer ears and nose were normal in appearance and the oropharynx was normal [No JVD] : no jugular venous distention [No Lymphadenopathy] : no lymphadenopathy [Supple] : supple [No Respiratory Distress] : no respiratory distress  [No Accessory Muscle Use] : no accessory muscle use [Clear to Auscultation] : lungs were clear to auscultation bilaterally [Normal Rate] : normal rate  [Regular Rhythm] : with a regular rhythm [Normal S1, S2] : normal S1 and S2 [Pedal Pulses Present] : the pedal pulses are present [No Edema] : there was no peripheral edema [Soft] : abdomen soft [Non Tender] : non-tender [Non-distended] : non-distended [No Rash] : no rash [Coordination Grossly Intact] : coordination grossly intact [No Focal Deficits] : no focal deficits [Normal Gait] : normal gait [Speech Grossly Normal] : speech grossly normal [Normal Affect] : the affect was normal [Alert and Oriented x3] : oriented to person, place, and time

## 2025-05-25 NOTE — ASSESSMENT
[FreeTextEntry1] : RTC in 5-10 weeks for annual CPE   Case d/w Dr. OLIVER Young  - Curtis Aleman MD   Internal Medicine, PGY-3   [facility], [facility]

## 2025-05-25 NOTE — HISTORY OF PRESENT ILLNESS
[de-identified] :  ID: [id number]  50 y/o female with PMH RHD s/p MVR on warfarin (Goal 2.5-3.5), pAfib s/p DCCV c/b Tachy/Glenn syndrome s/p MICRA, dilated RV and diastolic dysfunction with severe TR seen on echo p/f follow up. Patient reports feeling well. She states she had an "allergic reaction" a few weeks ago. This was the first reaction she had since 5 years ago when she had an anaphylactic reaction suspected 2/2 quinoa. This time she did not have a rash or difficulty breathing/throat swelling. She states she noticed swelling and redness of her fingers and having significant cough. She cannot identify any trigger that could have caused this. She states her son was present and used an old epipen they had which provided relief. During the episode she never had any wheezing/difficulty breathing, nausea/vomiting, etc. Did not notice any rash. Otherwise she feels and denies any chest pain, palpitations, dizziness, nausea/vomiting

## 2025-05-25 NOTE — HISTORY OF PRESENT ILLNESS
[de-identified] :  ID: [id number]  52 y/o female with PMH RHD s/p MVR on warfarin (Goal 2.5-3.5), pAfib s/p DCCV c/b Tachy/Glenn syndrome s/p MICRA, dilated RV and diastolic dysfunction with severe TR seen on echo p/f follow up. Patient reports feeling well. She states she had an "allergic reaction" a few weeks ago. This was the first reaction she had since 5 years ago when she had an anaphylactic reaction suspected 2/2 quinoa. This time she did not have a rash or difficulty breathing/throat swelling. She states she noticed swelling and redness of her fingers and having significant cough. She cannot identify any trigger that could have caused this. She states her son was present and used an old epipen they had which provided relief. During the episode she never had any wheezing/difficulty breathing, nausea/vomiting, etc. Did not notice any rash. Otherwise she feels and denies any chest pain, palpitations, dizziness, nausea/vomiting

## 2025-05-25 NOTE — REVIEW OF SYSTEMS
[Fever] : no fever [Chills] : no chills [Night Sweats] : no night sweats [Recent Change In Weight] : ~T no recent weight change [Discharge] : no discharge [Pain] : no pain [Vision Problems] : no vision problems [Earache] : no earache [Hearing Loss] : no hearing loss [Nasal Discharge] : no nasal discharge [Chest Pain] : no chest pain [Palpitations] : no palpitations [Lower Ext Edema] : no lower extremity edema [Orthopnea] : no orthopnea [Paroxysmal Nocturnal Dyspnea] : no paroxysmal nocturnal dyspnea [Shortness Of Breath] : no shortness of breath [Wheezing] : no wheezing [Cough] : no cough [Dyspnea on Exertion] : no dyspnea on exertion [Abdominal Pain] : no abdominal pain [Nausea] : no nausea [Constipation] : no constipation [Vomiting] : no vomiting [Heartburn] : no heartburn [Dysuria] : no dysuria [Hematuria] : no hematuria [Frequency] : no frequency [Joint Pain] : no joint pain [Joint Stiffness] : no joint stiffness [Muscle Pain] : no muscle pain [Itching] : no itching [Skin Rash] : no skin rash [Headache] : no headache [Dizziness] : no dizziness

## 2025-06-26 NOTE — PROCEDURE
[FreeTextEntry1] : RLE duplex performed to evaluate for residual R intramuscular hematoma demonstrates

## 2025-07-02 NOTE — REASON FOR VISIT
[Consultation] : a consultation visit [Post Hospitalization] : a post hospitalization visit [FreeTextEntry1] : R calf hematoma

## 2025-07-02 NOTE — ADDENDUM
[FreeTextEntry1] : This note was written by Ana Maria Dee NP, acting as a scribe for Dr. Carroll Luna.  I, Dr. Carroll Luna, have read and attest that all the information, medical decision-making, and discharge instructions within are true and accurate.  Recently admitted for R shin hematoma. Managed conservatively. Still with some tenderness, but per patient her swelling has improved. has some residual ecchymosis. Not can walk and flex/extend foot more. Follow up 1 month.   I, Dr. Carroll Luna, personally performed the evaluation and management (E/M) services for this new patient.  That E/M includes conducting the initial examination, assessing all conditions, and establishing the plan of care.  Today, my ACP, Ana Maria Dee NP, was here to observe my evaluation and management services for this patient to be followed going forward.

## 2025-07-02 NOTE — PHYSICAL EXAM
[Normal Thyroid] : the thyroid was normal [Carotid Bruits] : no carotid bruits [Normal Breath Sounds] : Normal breath sounds [Respiratory Effort] : normal respiratory effort [Normal Heart Sounds] : normal heart sounds [Normal Rate and Rhythm] : normal rate and rhythm [Right Carotid Bruit] : no bruit heard over the right carotid [Left Carotid Bruit] : no bruit heard over the left carotid [2+] : left 2+ [Ankle Swelling (On Exam)] : present [Ankle Swelling On The Right] : mild [No Rash or Lesion] : No rash or lesion [Alert] : alert [Calm] : calm [Ankle Swelling On The Left] : moderate [JVD] : no jugular venous distention  [Varicose Veins Of Lower Extremities] : not present [] : not present [Abdomen Masses] : No abdominal masses [Abdomen Tenderness] : ~T ~M No abdominal tenderness [Purpura] : no purpura  [Petechiae] : no petechiae [Skin Ulcer] : no ulcer [Skin Induration] : no induration [de-identified] : Healthy, NAD [de-identified] : NC/AT, anicteric [FreeTextEntry1] : RLE with moderate edema from knee down to toes. Worse distally and ecchymosis more prominent on medial/posterior knee, medial ankle and 2nd/3rd toes. Active ROM of R knee, ankle and toes, limited by pain and swelling.  [de-identified] : FROM throughout (see cardiovasc section for details on RLE), no palpable cords noted in either LE [de-identified] : Neurosensory/neuromotor grossly intact

## 2025-07-02 NOTE — ASSESSMENT
[Arterial/Venous Disease] : arterial/venous disease [FreeTextEntry1] : 4yoF w/PMHx of HTN, HLD, pAfib on warfarin, mitral stenosis due to rheumatic heart disease (now s/p mechanical MVR), CAD w/LAD occlusion now s/p Micra PPM, HFpEF, severe TR, pulmonary HTN, cirrhosis, presented to the ED 06/07 after a traumatic fall.  Pt states that he developed severe bruising of the RLE, CTA RLE in the ED demonstrated R AT muscle hematoma, stable on subsequent imaging.  INR was corrected while in-house and pt was discharged after some complications w/home PT. Today, she presents for f/u in the office. Reports finally this weekend she has been able to apply more pressure on the foot a walk a little around the house. She is still in a lot of pain and reports some improvement in the swelling by the knee but feels the ankle and foot/toes are more swollen. She also notes the bruising is now on the toes and medial aspect of the foot. She is receiving PT services. Accompanied by daughter. She brings paperwork to be filled out for work as she cannot currently work.   On exam, RLE with moderate edema from knee down to toes. Worse distally and ecchymosis more prominent on medial/posterior knee, medial ankle and 2nd/3rd toes. Active ROM of R knee, ankle and toes, limited by pain and swelling.   Plan: -Daily use of light compression bandages when out of bed. Pt and daughter educated on proper placement -Moisturize daily the skin using mineral oil, Aquaphor ointment and/or vitamin A&D ointment -Avoid prolonged periods of time with legs in dependent position. Recommend leg elevation above the level of the heart 1 hour in the morning and 1 hour in the afternoon -Calf pump exercises while resting -Continue PT sessions -Fall precautions reviewed -F/u 1 mo and will determine if pt ready to work

## 2025-07-02 NOTE — HISTORY OF PRESENT ILLNESS
[FreeTextEntry1] : 54yoF w/PMHx of HTN, HLD, pAfib on warfarin, mitral stenosis due to rheumatic heart disease (now s/p mechanical MVR), CAD w/LAD occlusion now s/p Micra PPM, HFpEF, severe TR, pulmonary HTN, cirrhosis, presented to the ED 06/07 after a traumatic fall.  Pt states that he developed severe bruising of the RLE, CTA RLE in the ED demonstrated R AT muscle hematoma, stable on subsequent imaging.  INR was corrected while in-house and pt was discharged after some complications w/home PT. Today, she presents for f/u in the office. Reports finally this weekend she has been able to apply more pressure on the foot a walk a little around the house. She is still in a lot of pain and reports some improvement in the swelling by the knee but feels the ankle and foot/toes are more swollen. She also notes the bruising is now on the toes and medial aspect of the foot. She is receiving PT services. Accompanied by daughter. She brings paperwork to be filled out for work as she cannot currently work.

## 2025-07-02 NOTE — PHYSICAL EXAM
[Normal Thyroid] : the thyroid was normal [Carotid Bruits] : no carotid bruits [Normal Breath Sounds] : Normal breath sounds [Respiratory Effort] : normal respiratory effort [Normal Heart Sounds] : normal heart sounds [Normal Rate and Rhythm] : normal rate and rhythm [Right Carotid Bruit] : no bruit heard over the right carotid [Left Carotid Bruit] : no bruit heard over the left carotid [2+] : left 2+ [Ankle Swelling (On Exam)] : present [Ankle Swelling On The Right] : mild [No Rash or Lesion] : No rash or lesion [Alert] : alert [Calm] : calm [Ankle Swelling On The Left] : moderate [JVD] : no jugular venous distention  [Varicose Veins Of Lower Extremities] : not present [] : not present [Abdomen Masses] : No abdominal masses [Abdomen Tenderness] : ~T ~M No abdominal tenderness [Purpura] : no purpura  [Petechiae] : no petechiae [Skin Ulcer] : no ulcer [Skin Induration] : no induration [de-identified] : Healthy, NAD [de-identified] : NC/AT, anicteric [FreeTextEntry1] : RLE with moderate edema from knee down to toes. Worse distally and ecchymosis more prominent on medial/posterior knee, medial ankle and 2nd/3rd toes. Active ROM of R knee, ankle and toes, limited by pain and swelling.  [de-identified] : FROM throughout (see cardiovasc section for details on RLE), no palpable cords noted in either LE [de-identified] : Neurosensory/neuromotor grossly intact

## 2025-07-02 NOTE — PHYSICAL EXAM
[Normal Thyroid] : the thyroid was normal [Carotid Bruits] : no carotid bruits [Normal Breath Sounds] : Normal breath sounds [Respiratory Effort] : normal respiratory effort [Normal Heart Sounds] : normal heart sounds [Normal Rate and Rhythm] : normal rate and rhythm [Right Carotid Bruit] : no bruit heard over the right carotid [Left Carotid Bruit] : no bruit heard over the left carotid [2+] : left 2+ [Ankle Swelling (On Exam)] : present [Ankle Swelling On The Right] : mild [No Rash or Lesion] : No rash or lesion [Alert] : alert [Calm] : calm [Ankle Swelling On The Left] : moderate [JVD] : no jugular venous distention  [Varicose Veins Of Lower Extremities] : not present [] : not present [Abdomen Masses] : No abdominal masses [Abdomen Tenderness] : ~T ~M No abdominal tenderness [Purpura] : no purpura  [Petechiae] : no petechiae [Skin Ulcer] : no ulcer [Skin Induration] : no induration [de-identified] : Healthy, NAD [de-identified] : NC/AT, anicteric [FreeTextEntry1] : RLE with moderate edema from knee down to toes. Worse distally and ecchymosis more prominent on medial/posterior knee, medial ankle and 2nd/3rd toes. Active ROM of R knee, ankle and toes, limited by pain and swelling.  [de-identified] : FROM throughout (see cardiovasc section for details on RLE), no palpable cords noted in either LE [de-identified] : Neurosensory/neuromotor grossly intact

## 2025-07-25 NOTE — DISCUSSION/SUMMARY
[Patient] : the patient [EKG obtained to assist in diagnosis and management of assessed problem(s)] : EKG obtained to assist in diagnosis and management of assessed problem(s) [FreeTextEntry2] : and her  [FreeTextEntry1] : 55 yo F with RHD s/p mechanical MVR on warfarin, AFib, hypertension, and severe TR who is overall doing well and improved on current therapies. She is ACC/AHA Stage C HF, NYHA Class II objectively based on 6MWT 2025 and is euvolemic. I have recommended the followin. NICM/Valvular Cardiomyopathy - Her TR was severe on echos dating as far back as  without overt sequelae until more recently, when she developed RV/RA dilation and required diuretics. Much improved on SGLT2-i and RHC in  with good filling pressures. Start Spironolactone 12.5 mg QD. If she becomes symptomatic, would consider switching ACE-I and CCB to ARNI. Labs from  (in [facility]) with K 4.3, Cr 0.5 and last pro-BNP in January remained normal at 120.   2. Hypertension - well controlled on current therapies. It is difficult to know if she has diastolic impairment as the mechanical MV impedes the echo measurement. No LVH to suggest she has HFpEF due to HHD.  No longer following Dr. Mckenna, will refer to a general cardiologist via [facility] care.   3. Pulm HTN - Improved based on last RHC in  with PA 35/.   4. Severe TR - TTE on current therapies in 2024 showed severe TR but she reports good quality of life without notable limitation though 6MWT 2025 consistent with NYHA Class II.   5. Transaminitis - Historically with periods of mild transaminase. AST/ALT in . She has never had formal evaluation for cardiac cirrhosis, referred to hepatology, Dr. Bradley Toribio.   6. Chronic AF - s/p ALISHA occlusion. On beta blocker and digoxin with rate control and back-up Micra PPM. On warfarin for mechanical MVR.  7. Pre-procedural cardiac risk assessment - No cardiac contraindication to undergo dental extraction but management of her anticoagulant, Coumadin will need to be addressed by her primary care team.   8. Follow-up with Dr. Escobedo in 6 months

## 2025-07-25 NOTE — REASON FOR VISIT
[Cardiac Failure] : cardiac failure [Language Line ] : provided by Language Line   [Interpreters_IDNumber] : [id number] [TWNoteComboBox1] : Congolese [FreeTextEntry1] : PATIENT INSTRUCTIONS:  1. Start Spironolactone 12.5 mg (1/2 tablet) once daily in the mornings  2. Labs in one week at your local [facility] lab  3. Will refer you to a liver specialist, Dr. Toribio as well as a general cardiologist  4. No issues from a heart failure perspective to undergo dental extraction but please follow-up with your primary care office for instructions on Coumadin  5. Follow-up with Dr. Escobedo in 6 months

## 2025-07-25 NOTE — CARDIOLOGY SUMMARY
[de-identified] : 7/18/25 ECG: electronic ventricular pacemaker at 81 bpm, no significant change from prior  1/17/2025 ECG: V paced at 69 bpm, no sig change compared to 6/20/2024 06/20/24 ECG: AF, demand V-paced. No sig change from 1/2/24. [de-identified] : 02/27/25 6MWT: Rest /60, O2 98% on RA. Post Walk /58, O2 97% on RA. Max HR 96 bpm. Total distance is 406 meters, consistent with NYHA Class II data.  [de-identified] : 04/28/25 TTE: LVIDd 4.2 cm, LVEF 61%, interventricular septum flattened in diastole consistent with RV volume overload, septum 0.8 cm, PWT 1 cm, mod RVE with normal function (TAPSE 2.4 cm), severe HELLEN, mechanical MV well seated with normal function (peak/mean gradient 15.7 and 4.33 mmHg respectively at a HR of 66 bpm), torrential TR, IVC dilated with abnormal inspiratory collapse   7/9/2024 TTE: LVIDd 4.52 cm, LVEF 60-65% w/o regional WMA, RV is dilated with normal function, severe HELLEN, mechanical MV is noted w/ normal appearing function (mean TVG is 4.94 mmHg at a HR 65 bpm), severe TR with PASP 41 mmHg, IVC is dilated, no pericardial effusion  8/08/2023 DANIEL: LV not well visualized due to mitral prosthesis, RV dilated and RV systolic function is normal, HELLEN, ALISHA is surgically ligated-no communication is seen by color flow, mechanical MV in MV position w/ normal appearing function (mean TVG 5 mmHg, HR 73 bpm), severe TR w/ incomplete coaptation of TV, the septal leaflet is restricted, RASP 23 mmHg, no pericardial effusion  6/29/2023 TTE: LVIDD 4.81 cm, no LVH, LVEF 55-60% abnl septal wall motion seen w/ right ventricular volume overload, diastolic function challenging in the presence of bioprosthetic MV, RV is severely dilated and systolic function is mildly reduced, severe HELLEN, mechanical MV w/ normal appearing function (mean TVG 4 mmHg at 60 bpm), mild AR, severe TR, mild PH, PASP 43 mmHg, IVC dilated, no pericardial effusion  [de-identified] : 06/06/24 RHC: RA 10 (v-wave 18), RV 33/9, PA 35/12/20, PCW 10, PA 75%, Ao 95%, CO/CI (F) 4.1/3.0, PVR 2.4, Jaqui 23, IVC 12, hepatic wedge 12, transhepatic gradient 0. No BP given to calculate SVR.

## 2025-07-25 NOTE — PHYSICAL EXAM
DISCHARGE SUMMARY:  Discharge Time: 1858  Via:  Wheelchair  Accompanied by:  staff  Verbal Instructions given: Yes  Verbalized understanding: Yes  Written Instructions given: Yes  Patient Able to Void: Yes  Discharged Stable Per MD Order: Yes     [No Xanthelasma] : no xanthelasma [Normal S1, S2] : normal S1, S2 [No Rub] : no rub [No Gallop] : no gallop [Murmur] : murmur [Soft] : abdomen soft [Non Tender] : non-tender [Normal Bowel Sounds] : normal bowel sounds [Normal Radial B/L] : normal radial B/L [Alert and Oriented] : alert and oriented [de-identified] : RLE trace edema. No LLE edema  [Normal] : no edema, no cyanosis, no clubbing, no varicosities [de-identified] : MMM, no perioral cyanosis [de-identified] : pulsatile JVP 10-12 cm H2O with prominent V waves [de-identified] : crisp mechanical S1, normal S2 [de-identified] : III/VI KAUSHAL at [facility] [de-identified] : nonpulsatile liver edge [de-identified] : warm and dry

## 2025-07-25 NOTE — HISTORY OF PRESENT ILLNESS
[FreeTextEntry1] : Ms. Alexandra Tong is a 53 YO Icelandic speaking F w/ valvular CMY due to RHD, preserved LV function, AFib, mitral stenosis s/p mechanical MVR & ALISHA occlusion (2004), heart block s/p Micra PPM 5/2020 and persistent severe TR. She is here for routine follow-up of her cardiomyopathy.   She has had longstanding severe TR dating back as far as 2011 without overt sequelae but more recently in 2024, TTE notable for RV/RA dilated requiring diuretics. She has felt much better since the introduction of Jardiance in April 2024 along with a reduction of furosemide to 10 mg daily. RHC w/ hepatic wedge pressure 6/2024 with good filling pressures.   ===  Earlier this year, underwent 6MWT to objectively define functional class, data consistent with NYHA Class II. She underwent TTE with Dr. Bruner in April and was informed no significant changes from prior. More recently, she was briefly admitted in June following traumatic mechanical fall c/b R anterior tibialis intramuscular hematoma with active hemorrhage noted on CTA. Stable on subsequent imaging. INR on admission 4.2 and corrected.   Still with pain in R leg but much improved and manageable without analgesics. Otherwise, no changes in breathing. She has been walking to her local supermarket which is 4 blocks each way and able to carry groceries without difficulties. 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 She denies CP, SOB at rest, orthopnea, PND, LH/dizziness, abdominal discomfort, LE edema, palpitations, and syncope. Her appetite is normal, and her bladder habits are unchanged. She has a Micra PPM in place. She does not limit fluid but does reduce sodium in her diet and she is taking her medications as directed. She does not use NSAIDs. She has not been admitted to the hospital or seen in the ER for HF in the interim.

## 2025-07-25 NOTE — DISCUSSION/SUMMARY
[Patient] : the patient [EKG obtained to assist in diagnosis and management of assessed problem(s)] : EKG obtained to assist in diagnosis and management of assessed problem(s) [FreeTextEntry2] : and her  [FreeTextEntry1] : 53 yo F with RHD s/p mechanical MVR on warfarin, AFib, hypertension, and severe TR who is overall doing well and improved on current therapies. She is ACC/AHA Stage C HF, NYHA Class II objectively based on 6MWT 2025 and is euvolemic. I have recommended the followin. NICM/Valvular Cardiomyopathy - Her TR was severe on echos dating as far back as  without overt sequelae until more recently, when she developed RV/RA dilation and required diuretics. Much improved on SGLT2-i and RHC in  with good filling pressures. Start Spironolactone 12.5 mg QD. If she becomes symptomatic, would consider switching ACE-I and CCB to ARNI. Labs from  (in [facility]) with K 4.3, Cr 0.5 and last pro-BNP in January remained normal at 120.   2. Hypertension - well controlled on current therapies. It is difficult to know if she has diastolic impairment as the mechanical MV impedes the echo measurement. No LVH to suggest she has HFpEF due to HHD.  No longer following Dr. Mckenna, will refer to a general cardiologist via [facility] care.   3. Pulm HTN - Improved based on last RHC in  with PA 35/.   4. Severe TR - TTE on current therapies in 2024 showed severe TR but she reports good quality of life without notable limitation though 6MWT 2025 consistent with NYHA Class II.   5. Transaminitis - Historically with periods of mild transaminase. AST/ALT in . She has never had formal evaluation for cardiac cirrhosis, referred to hepatology, Dr. Bradley Toribio.   6. Chronic AF - s/p ALISHA occlusion. On beta blocker and digoxin with rate control and back-up Micra PPM. On warfarin for mechanical MVR.  7. Pre-procedural cardiac risk assessment - No cardiac contraindication to undergo dental extraction but management of her anticoagulant, Coumadin will need to be addressed by her primary care team.   8. Follow-up with Dr. Escobedo in 6 months

## 2025-07-25 NOTE — REASON FOR VISIT
[Cardiac Failure] : cardiac failure [Language Line ] : provided by Language [employer]   [Interpreters_IDNumber] : [id number] [TWNoteComboBox1] : Ivorian [FreeTextEntry1] : PATIENT INSTRUCTIONS:  1. Start Spironolactone 12.5 mg (1/2 tablet) once daily in the mornings  2. Labs in one week at your local [facility] lab  3. Will refer you to a liver specialist, Dr. Toribio as well as a general cardiologist  4. No issues from a heart failure perspective to undergo dental extraction but please follow-up with your primary care office for instructions on Coumadin  5. Follow-up with Dr. Escobedo in 6 months

## 2025-07-25 NOTE — HISTORY OF PRESENT ILLNESS
[FreeTextEntry1] : Ms. Alexandra Tong is a 55 YO Hebrew speaking F w/ valvular CMY due to RHD, preserved LV function, AFib, mitral stenosis s/p mechanical MVR & ALISHA occlusion (2004), heart block s/p Micra PPM 5/2020 and persistent severe TR. She is here for routine follow-up of her cardiomyopathy.   She has had longstanding severe TR dating back as far as 2011 without overt sequelae but more recently in 2024, TTE notable for RV/RA dilated requiring diuretics. She has felt much better since the introduction of Jardiance in April 2024 along with a reduction of furosemide to 10 mg daily. RHC w/ hepatic wedge pressure 6/2024 with good filling pressures.   ===  Earlier this year, underwent 6MWT to objectively define functional class, data consistent with NYHA Class II. She underwent TTE with Dr. Bruner in April and was informed no significant changes from prior. More recently, she was briefly admitted in June following traumatic mechanical fall c/b R anterior tibialis intramuscular hematoma with active hemorrhage noted on CTA. Stable on subsequent imaging. INR on admission 4.2 and corrected.   Still with pain in R leg but much improved and manageable without analgesics. Otherwise, no changes in breathing. She has been walking to her local supermarket which is 4 blocks each way and able to carry groceries without difficulties. 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 She denies CP, SOB at rest, orthopnea, PND, LH/dizziness, abdominal discomfort, LE edema, palpitations, and syncope. Her appetite is normal, and her bladder habits are unchanged. She has a Micra PPM in place. She does not limit fluid but does reduce sodium in her diet and she is taking her medications as directed. She does not use NSAIDs. She has not been admitted to the hospital or seen in the ER for HF in the interim.

## 2025-07-25 NOTE — END OF VISIT
[Time Spent: ___ minutes] : I have spent [unfilled] minutes of time on the encounter which excludes teaching and separately reported services. [FreeTextEntry3] : I, Dr. Escobedo, personally performed the evaluation and management (E/M) services for this established patient who presents today with (a) new problem(s)/exacerbation of (an) existing condition(s).  That E/M includes conducting the examination, assessing all new/exacerbated conditions, and establishing a new plan of care.  Today, my EVAN, [employer], was here to observe my evaluation and management services for this new problem/exacerbated condition to be followed going forward.

## 2025-07-25 NOTE — REASON FOR VISIT
[Cardiac Failure] : cardiac failure [Language Line ] : provided by Language Line   [Interpreters_IDNumber] : [id number] [TWNoteComboBox1] : [employer] [FreeTextEntry1] : PATIENT INSTRUCTIONS:  1. Start Spironolactone 12.5 mg (1/2 tablet) once daily in the mornings  2. Labs in one week at your local [facility] lab  3. Will refer you to a liver specialist, Dr. Toribio as well as a general cardiologist  4. No issues from a heart failure perspective to undergo dental extraction but please follow-up with your primary care office for instructions on Coumadin  5. Follow-up with Dr. Escobedo in 6 months

## 2025-07-25 NOTE — HISTORY OF PRESENT ILLNESS
[FreeTextEntry1] : Ms. Alexandra Tong is a 53 YO Polish speaking F w/ valvular CMY due to RHD, preserved LV function, AFib, mitral stenosis s/p mechanical MVR & ALISHA occlusion (2004), heart block s/p Micra PPM 5/2020 and persistent severe TR. She is here for routine follow-up of her cardiomyopathy.   She has had longstanding severe TR dating back as far as 2011 without overt sequelae but more recently in 2024, TTE notable for RV/RA dilated requiring diuretics. She has felt much better since the introduction of Jardiance in April 2024 along with a reduction of furosemide to 10 mg daily. RHC w/ hepatic wedge pressure 6/2024 with good filling pressures.   ===  Earlier this year, underwent 6MWT to objectively define functional class, data consistent with NYHA Class II. She underwent TTE with Dr. Bruner in April and was informed no significant changes from prior. More recently, she was briefly admitted in June following traumatic mechanical fall c/b R anterior tibialis intramuscular hematoma with active hemorrhage noted on CTA. Stable on subsequent imaging. INR on admission 4.2 and corrected.   Still with pain in R leg but much improved and manageable without analgesics. Otherwise, no changes in breathing. She has been walking to her local supermarket which is 4 blocks each way and able to carry groceries without difficulties. 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 She denies CP, SOB at rest, orthopnea, PND, LH/dizziness, abdominal discomfort, LE edema, palpitations, and syncope. Her appetite is normal, and her bladder habits are unchanged. She has a Micra PPM in place. She does not limit fluid but does reduce sodium in her diet and she is taking her medications as directed. She does not use NSAIDs. She has not been admitted to the hospital or seen in the ER for HF in the interim.

## 2025-07-25 NOTE — PHYSICAL EXAM
[No Xanthelasma] : no xanthelasma [Normal S1, S2] : normal S1, S2 [No Rub] : no rub [No Gallop] : no gallop [Murmur] : murmur [Soft] : abdomen soft [Non Tender] : non-tender [Normal Bowel Sounds] : normal bowel sounds [Normal Radial B/L] : normal radial B/L [Alert and Oriented] : alert and oriented [de-identified] : RLE trace edema. No LLE edema  [Normal] : no edema, no cyanosis, no clubbing, no varicosities [de-identified] : MMM, no perioral cyanosis [de-identified] : pulsatile JVP 10-12 cm H2O with prominent V waves [de-identified] : III/VI K[facility]AL at [facility] [de-identified] : crisp mechanical S1, normal S2 [de-identified] : nonpulsatile liver edge [de-identified] : warm and dry

## 2025-07-25 NOTE — END OF VISIT
[Time Spent: ___ minutes] : I have spent [unfilled] minutes of time on the encounter which excludes teaching and separately reported services. [FreeTextEntry3] : I, Dr. Escobedo, personally performed the evaluation and management (E/M) services for this established patient who presents today with (a) new problem(s)/exacerbation of (an) existing condition(s).  That E/M includes conducting the examination, assessing all new/exacerbated conditions, and establishing a new plan of care.  Today, my EVAN, [employer], was here to observe my evaluation and management services for this new problem/exacerbated condition to be followed going forward.

## 2025-07-25 NOTE — DISCUSSION/SUMMARY
[Patient] : the patient [EKG obtained to assist in diagnosis and management of assessed problem(s)] : EKG obtained to assist in diagnosis and management of assessed problem(s) [FreeTextEntry2] : and her  [FreeTextEntry1] : 55 yo F with RHD s/p mechanical MVR on warfarin, AFib, hypertension, and severe TR who is overall doing well and improved on current therapies. She is ACC/AHA Stage C HF, NYHA Class II objectively based on 6MWT 2025 and is euvolemic. I have recommended the followin. NICM/Valvular Cardiomyopathy - Her TR was severe on echos dating as far back as  without overt sequelae until more recently, when she developed RV/RA dilation and required diuretics. Much improved on SGLT2-i and RHC in  with good filling pressures. Start Spironolactone 12.5 mg QD. If she becomes symptomatic, would consider switching ACE-I and CCB to ARNI. Labs from  (in [facility]IE) with K 4.3, Cr 0.5 and last pro-BNP in January remained normal at 120.   2. Hypertension - well controlled on current therapies. It is difficult to know if she has diastolic impairment as the mechanical MV impedes the echo measurement. No LVH to suggest she has HFpEF due to HHD.  No longer following Dr. Mckenna, will refer to a general cardiologist via [facility] care.   3. Pulm HTN - Improved based on last RHC in  with PA 35/.   4. Severe TR - TTE on current therapies in 2024 showed severe TR but she reports good quality of life without notable limitation though 6MWT 2025 consistent with NYHA Class II.   5. Transaminitis - Historically with periods of mild transaminase. AST/ALT in . She has never had formal evaluation for cardiac cirrhosis, referred to hepatology, Dr. Bradley Toribio.   6. Chronic AF - s/p ALISHA occlusion. On beta blocker and digoxin with rate control and back-up Micra PPM. On warfarin for mechanical MVR.  7. Pre-procedural cardiac risk assessment - No cardiac contraindication to undergo dental extraction but management of her anticoagulant, Coumadin will need to be addressed by her primary care team.   8. Follow-up with Dr. Escobedo in 6 months

## 2025-07-25 NOTE — CARDIOLOGY SUMMARY
[de-identified] : 7/18/25 ECG: electronic ventricular pacemaker at 81 bpm, no significant change from prior  1/17/2025 ECG: V paced at 69 bpm, no sig change compared to 6/20/2024 06/20/24 ECG: AF, demand V-paced. No sig change from 1/2/24. [de-identified] : 02/27/25 6MWT: Rest /60, O2 98% on RA. Post Walk /58, O2 97% on RA. Max HR 96 bpm. Total distance is 406 meters, consistent with NYHA Class II data.  [de-identified] : 04/28/25 TTE: LVIDd 4.2 cm, LVEF 61%, interventricular septum flattened in diastole consistent with RV volume overload, septum 0.8 cm, PWT 1 cm, mod RVE with normal function (TAPSE 2.4 cm), severe HELLEN, mechanical MV well seated with normal function (peak/mean gradient 15.7 and 4.33 mmHg respectively at a HR of 66 bpm), torrential TR, IVC dilated with abnormal inspiratory collapse   7/9/2024 TTE: LVIDd 4.52 cm, LVEF 60-65% w/o regional WMA, RV is dilated with normal function, severe HELLEN, mechanical MV is noted w/ normal appearing function (mean TVG is 4.94 mmHg at a HR 65 bpm), severe TR with PASP 41 mmHg, IVC is dilated, no pericardial effusion  8/08/2023 DANIEL: LV not well visualized due to mitral prosthesis, RV dilated and RV systolic function is normal, HELLEN, ALISHA is surgically ligated-no communication is seen by color flow, mechanical MV in MV position w/ normal appearing function (mean TVG 5 mmHg, HR 73 bpm), severe TR w/ incomplete coaptation of TV, the septal leaflet is restricted, RASP 23 mmHg, no pericardial effusion  6/29/2023 TTE: LVIDD 4.81 cm, no LVH, LVEF 55-60% abnl septal wall motion seen w/ right ventricular volume overload, diastolic function challenging in the presence of bioprosthetic MV, RV is severely dilated and systolic function is mildly reduced, severe HELLEN, mechanical MV w/ normal appearing function (mean TVG 4 mmHg at 60 bpm), mild AR, severe TR, mild PH, PASP 43 mmHg, IVC dilated, no pericardial effusion  [de-identified] : 06/06/24 RHC: RA 10 (v-wave 18), RV 33/9, PA 35/12/20, PCW 10, PA 75%, Ao 95%, CO/CI (F) 4.1/3.0, PVR 2.4, Jaqui 23, IVC 12, hepatic wedge 12, transhepatic gradient 0. No BP given to calculate SVR.

## 2025-07-25 NOTE — PHYSICAL EXAM
[No Xanthelasma] : no xanthelasma [Normal S1, S2] : normal S1, S2 [No Rub] : no rub [No Gallop] : no gallop [Murmur] : murmur [Soft] : abdomen soft [Non Tender] : non-tender [Normal Bowel Sounds] : normal bowel sounds [Normal Radial B/L] : normal radial B/L [Alert and Oriented] : alert and oriented [de-identified] : RLE trace edema. No LLE edema  [Normal] : no edema, no cyanosis, no clubbing, no varicosities [de-identified] : MMM, no perioral cyanosis [de-identified] : pulsatile JVP 10-12 cm H2O with prominent V waves [de-identified] : III/VI K[facility]AL at [facility] [de-identified] : crisp mechanical S1, normal S2 [de-identified] : nonpulsatile liver edge [de-identified] : warm and dry

## 2025-07-25 NOTE — END OF VISIT
[Time Spent: ___ minutes] : I have spent [unfilled] minutes of time on the encounter which excludes teaching and separately reported services. [FreeTextEntry3] : I, Dr. Escobedo, personally performed the evaluation and management (E/M) services for this established patient who presents today with (a) new problem(s)/exacerbation of (an) existing condition(s).  That E/M includes conducting the examination, assessing all new/exacerbated conditions, and establishing a new plan of care.  Today, my EVAN, [employer], was here to observe my evaluation and management services for this new problem/exacerbated condition to be followed going forward.

## 2025-07-25 NOTE — CARDIOLOGY SUMMARY
[de-identified] : 7/18/25 ECG: electronic ventricular pacemaker at 81 bpm, no significant change from prior  1/17/2025 ECG: V paced at 69 bpm, no sig change compared to 6/20/2024 06/20/24 ECG: AF, demand V-paced. No sig change from 1/2/24. [de-identified] : 02/27/25 6MWT: Rest /60, O2 98% on RA. Post Walk /58, O2 97% on RA. Max HR 96 bpm. Total distance is 406 meters, consistent with NYHA Class II data.  [de-identified] : 04/28/25 TTE: LVIDd 4.2 cm, LVEF 61%, interventricular septum flattened in diastole consistent with RV volume overload, septum 0.8 cm, PWT 1 cm, mod RVE with normal function (TAPSE 2.4 cm), severe HELLEN, mechanical MV well seated with normal function (peak/mean gradient 15.7 and 4.33 mmHg respectively at a HR of 66 bpm), torrential TR, IVC dilated with abnormal inspiratory collapse   7/9/2024 TTE: LVIDd 4.52 cm, LVEF 60-65% w/o regional WMA, RV is dilated with normal function, severe HELLEN, mechanical MV is noted w/ normal appearing function (mean TVG is 4.94 mmHg at a HR 65 bpm), severe TR with PASP 41 mmHg, IVC is dilated, no pericardial effusion  8/08/2023 DANIEL: LV not well visualized due to mitral prosthesis, RV dilated and RV systolic function is normal, HELLEN, ALISHA is surgically ligated-no communication is seen by color flow, mechanical MV in MV position w/ normal appearing function (mean TVG 5 mmHg, HR 73 bpm), severe TR w/ incomplete coaptation of TV, the septal leaflet is restricted, RASP 23 mmHg, no pericardial effusion  6/29/2023 TTE: LVIDD 4.81 cm, no LVH, LVEF 55-60% abnl septal wall motion seen w/ right ventricular volume overload, diastolic function challenging in the presence of bioprosthetic MV, RV is severely dilated and systolic function is mildly reduced, severe HELLEN, mechanical MV w/ normal appearing function (mean TVG 4 mmHg at 60 bpm), mild AR, severe TR, mild PH, PASP 43 mmHg, IVC dilated, no pericardial effusion  [de-identified] : 06/06/24 RHC: RA 10 (v-wave 18), RV 33/9, PA 35/12/20, PCW 10, PA 75%, Ao 95%, CO/CI (F) 4.1/3.0, PVR 2.4, Jaqui 23, IVC 12, hepatic wedge 12, transhepatic gradient 0. No BP given to calculate SVR.

## 2025-07-28 NOTE — PHYSICAL EXAM
[JVD] : no jugular venous distention  [Varicose Veins Of Lower Extremities] : not present [] : not present [Abdomen Masses] : No abdominal masses [Abdomen Tenderness] : ~T ~M No abdominal tenderness [Purpura] : no purpura  [Petechiae] : no petechiae [Skin Ulcer] : no ulcer [Skin Induration] : no induration [de-identified] : Healthy, NAD [de-identified] : NC/AT, anicteric [FreeTextEntry1] : RLE with moderate edema from knee down to toes. Worse distally and ecchymosis more prominent on medial/posterior knee, medial ankle and 2nd/3rd toes. Active ROM of R knee, ankle and toes, limited by pain and swelling.  [de-identified] : FROM throughout (see cardiovasc section for details on RLE), no palpable cords noted in either LE [de-identified] : Neurosensory/neuromotor grossly intact

## 2025-07-28 NOTE — ASSESSMENT
[FreeTextEntry1] : 54yoF w/PMHx of HTN, HLD, pAfib on warfarin, mitral stenosis due to rheumatic heart disease (now s/p mechanical MVR), CAD w/LAD occlusion now s/p Micra PPM, HFpEF, severe TR, pulmonary HTN, cirrhosis, presented to the ED 06/07 after a traumatic fall, diagnosed w/R AT muscle hematoma, stable on subsequent imaging.  INR was corrected while in-house and pt was discharged after some complications w/home PT, she returns today for reevaluation, reports some bruising at the ankle and radiating pain from the back to the R calf.  Pt also notes some intermittent swelling and associated numbness/tingling in the lower leg, at times.  Pt still has PT TIW for management of her back and knee pain.  On exam, RLE w/o edema, minimal, resolving ecchymosis noted at the R ankle.  Active ROM of R knee, ankle and toes, limited by pain and swelling; POS straight leg raise noted in the RLE.   Plan: -Start use of a compression sock (knee-high, 15-20mmHg qd; Rx provided to pt) -Moisturize daily the skin using mineral oil, Aquaphor ointment and/or vitamin A&D ointment -Avoid prolonged periods of time with legs in dependent position. Recommend leg elevation above the level of the heart 1 hour in the morning and 1 hour in the afternoon -Calf pump exercises while resting -Continue PT sessions -Fall precautions reviewed -F/u PRN

## 2025-07-28 NOTE — HEALTH RISK ASSESSMENT
[Good] : ~his/her~  mood as  good [Intercurrent ED visits] : went to ED [No] : In the past 12 months have you used drugs other than those required for medical reasons? No [Any fall with injury in past year] : Patient reported fall with injury in the past year [1] : 2) Feeling down, depressed, or hopeless for several days (1) [PHQ-2 Negative - No further assessment needed] : PHQ-2 Negative - No further assessment needed [Never] : Never [Patient reported mammogram was normal] : Patient reported mammogram was normal [NO] : No [Fully functional (bathing, dressing, toileting, transferring, walking, feeding)] : Fully functional (bathing, dressing, toileting, transferring, walking, feeding) [Fully functional (using the telephone, shopping, preparing meals, housekeeping, doing laundry, using] : Fully functional and needs no help or supervision to perform IADLs (using the telephone, shopping, preparing meals, housekeeping, doing laundry, using transportation, managing medications and managing finances) [[id number]] : 2 [MammogramDate] : 06/2024 [PapSmearDate] : 2023 [ColonoscopyComments] : Cologaurd

## 2025-07-28 NOTE — PHYSICAL EXAM
[JVD] : no jugular venous distention  [Varicose Veins Of Lower Extremities] : not present [] : not present [Abdomen Masses] : No abdominal masses [Abdomen Tenderness] : ~T ~M No abdominal tenderness [Purpura] : no purpura  [Petechiae] : no petechiae [Skin Ulcer] : no ulcer [Skin Induration] : no induration [de-identified] : Healthy, NAD [de-identified] : NC/AT, anicteric [FreeTextEntry1] : RLE with moderate edema from knee down to toes. Worse distally and ecchymosis more prominent on medial/posterior knee, medial ankle and 2nd/3rd toes. Active ROM of R knee, ankle and toes, limited by pain and swelling.  [de-identified] : FROM throughout (see cardiovasc section for details on RLE), no palpable cords noted in either LE [de-identified] : Neurosensory/neuromotor grossly intact

## 2025-07-28 NOTE — INTERPRETER SERVICES
[Language Line ] : provided by [employer]   [Time Spent: ____ minutes] : Total time spent using  services: [unfilled] minutes. The patient's primary language is not English thus required  services. [Interpreters_IDNumber] : [id number]

## 2025-07-28 NOTE — HEALTH RISK ASSESSMENT
[Good] : ~his/her~  mood as  good [Intercurrent ED visits] : went to ED [No] : In the past 12 months have you used drugs other than those required for medical reasons? No [Any fall with injury in past year] : Patient reported fall with injury in the past year [1] : 2) Feeling down, depressed, or hopeless for several days (1) [PHQ-2 Negative - No further assessment needed] : PHQ-2 Negative - No further assessment needed [Never] : Never [Patient reported mammogram was normal] : Patient reported mammogram was normal [NO] : No [Fully functional (bathing, dressing, toileting, transferring, walking, feeding)] : Fully functional (bathing, dressing, toileting, transferring, walking, feeding) [Fully functional (using the telephone, shopping, preparing meals, housekeeping, doing laundry, using] : Fully functional and needs no help or supervision to perform IADLs (using the telephone, shopping, preparing meals, housekeeping, doing laundry, using transportation, managing medications and managing finances) [UPM8Vitic] : 2 [MammogramDate] : 06/2024 [PapSmearDate] : 2023 [ColonoscopyComments] : Cologaurd

## 2025-07-28 NOTE — ADDENDUM
[FreeTextEntry1] : This note was written by Mariano Mendez, acting as a scribe for Dr. Carroll Luna.  I, Dr. Carroll Luna, have read and attest that all the information, medical decision-making, and discharge instructions within are true and accurate.   I, Dr. Carroll Luna, personally performed the evaluation and management (E/M) services for this established patient who presents today with (an) existing condition(s).  That E/M includes conducting the examination, assessing all conditions, and (re)establishing/reinforcing a plan of care.  Today, my ACP, Mariano Mendez, was here to observe my evaluation and management services for this condition to be followed going forward.

## 2025-07-28 NOTE — HISTORY OF PRESENT ILLNESS
[FreeTextEntry1] : CPE & INR check [de-identified] : 50 y/o female with PMH RHD s/p MVR on warfarin (Goal 2.5-3.5), pAfib s/p DCCV c/b Tachy/Glenn syndrome s/p MICRA, dilated RV and diastolic dysfunction with severe TR seen on echo p/f CPE and INR check. Patient was seen in ED 2 months ago after a mechanical fall which caused severe bruising. CT showed RLE hematoma. Patient currently seeing workers compensation doctor and physical therapy for pain with ambulation. Patient had no new acute complaints today. Patient has been out of work on disability due to her injuries and has been feeling sad about that.  Patient also here after change in coumadin schedule last week. Denies easy bleeding, bruising, epistaxis, melena or hematuria Patient also denies chest pain, SOB, palpitation, abdominal pain, dysuria, hot flashes, irritability, mood changes, vaginal dryness.

## 2025-07-28 NOTE — HISTORY OF PRESENT ILLNESS
[FreeTextEntry1] : 54yoF w/PMHx of HTN, HLD, pAfib on warfarin, mitral stenosis due to rheumatic heart disease (now s/p mechanical MVR), CAD w/LAD occlusion now s/p Micra PPM, HFpEF, severe TR, pulmonary HTN, cirrhosis, presented to the ED 06/07 after a traumatic fall, diagnosed w/R AT muscle hematoma, stable on subsequent imaging.  INR was corrected while in-house and pt was discharged after some complications w/home PT, she returns today for reevaluation, reports some bruising at the ankle and radiating pain from the back to the R calf.  Pt also notes some intermittent swelling and associated numbness/tingling in the lower leg, at times.

## 2025-07-28 NOTE — INTERPRETER SERVICES
[Language Line ] : provided by [employer]   [Time Spent: ____ minutes] : Total time spent using  services: [unfilled] minutes. The patient's primary language is not English thus required  services. [Interpreters_IDNumber] : [id number]

## 2025-07-28 NOTE — HISTORY OF PRESENT ILLNESS
[FreeTextEntry1] : CPE & INR check [de-identified] : 52 y/o female with PMH RHD s/p MVR on warfarin (Goal 2.5-3.5), pAfib s/p DCCV c/b Tachy/Glenn syndrome s/p MICRA, dilated RV and diastolic dysfunction with severe TR seen on echo p/f CPE and INR check. Patient was seen in ED 2 months ago after a mechanical fall which caused severe bruising. CT showed RLE hematoma. Patient currently seeing workers compensation doctor and physical therapy for pain with ambulation. Patient had no new acute complaints today. Patient has been out of work on disability due to her injuries and has been feeling sad about that.  Patient also here after change in coumadin schedule last week. Denies easy bleeding, bruising, epistaxis, melena or hematuria Patient also denies chest pain, SOB, palpitation, abdominal pain, dysuria, hot flashes, irritability, mood changes, vaginal dryness.

## 2025-07-28 NOTE — ASSESSMENT
[Vaccines Reviewed] : Immunizations reviewed today. Please see immunization details in the vaccine log within the immunization flowsheet.  [FreeTextEntry1] :   # Healthcare maintenance - Patient due for yearly mammogram, referral provided, prior June 2024 BIRADS 2 - Pap smear: 2 years ago, has gyn appt this week - Colon cancer screen: Cologaurd in 2024 - DEXA: N/a  Vaccinations - COVID vaccine x3 - Pneumonia vaccine: 2024 - SHINGRIX: per patient, previously obtained

## 2025-07-28 NOTE — PHYSICAL EXAM
[No Acute Distress] : no acute distress [Well Nourished] : well nourished [Well Developed] : well developed [Well-Appearing] : well-appearing [Normal Sclera/Conjunctiva] : normal sclera/conjunctiva [PERRL] : pupils equal round and reactive to light [EOMI] : extraocular movements intact [No Respiratory Distress] : no respiratory distress  [No Accessory Muscle Use] : no accessory muscle use [Clear to Auscultation] : lungs were clear to auscultation bilaterally [Normal Rate] : normal rate  [Regular Rhythm] : with a regular rhythm [Normal S1, S2] : normal S1 and S2 [No Murmur] : no murmur heard [Soft] : abdomen soft [Non-distended] : non-distended [Normal Bowel Sounds] : normal bowel sounds [Grossly Normal Strength/Tone] : grossly normal strength/tone [Normal] : affect was normal and insight and judgment were intact [de-identified] : abdominal tenderness in right upper and lower quadrants, palpable liver edge  [de-identified] : lower paraspinal tenderness, no brusining [de-identified] : right ankle swelling

## 2025-07-28 NOTE — PHYSICAL EXAM
[No Acute Distress] : no acute distress [Well Nourished] : well nourished [Well Developed] : well developed [Well-Appearing] : well-appearing [Normal Sclera/Conjunctiva] : normal sclera/conjunctiva [PERRL] : pupils equal round and reactive to light [EOMI] : extraocular movements intact [No Respiratory Distress] : no respiratory distress  [No Accessory Muscle Use] : no accessory muscle use [Clear to Auscultation] : lungs were clear to auscultation bilaterally [Normal Rate] : normal rate  [Regular Rhythm] : with a regular rhythm [Normal S1, S2] : normal S1 and S2 [No Murmur] : no murmur heard [Soft] : abdomen soft [Non-distended] : non-distended [Normal Bowel Sounds] : normal bowel sounds [Grossly Normal Strength/Tone] : grossly normal strength/tone [Normal] : affect was normal and insight and judgment were intact [de-identified] : abdominal tenderness in right upper and lower quadrants, palpable liver edge  [de-identified] : lower paraspinal tenderness, no brusining [de-identified] : right ankle swelling

## 2025-07-28 NOTE — REVIEW OF SYSTEMS
[Abdominal Pain] : abdominal pain [Joint Swelling] : joint swelling [Muscle Pain] : muscle pain [Back Pain] : back pain [Negative] : Heme/Lymph [Earache] : no earache [Nasal Discharge] : no nasal discharge [Sore Throat] : no sore throat [Nausea] : no nausea [Constipation] : no constipation [Diarrhea] : diarrhea [Vomiting] : no vomiting [Heartburn] : no heartburn [Melena] : no melena [Joint Pain] : no joint pain [Muscle Weakness] : no muscle weakness